# Patient Record
Sex: MALE | Race: OTHER | HISPANIC OR LATINO | URBAN - METROPOLITAN AREA
[De-identification: names, ages, dates, MRNs, and addresses within clinical notes are randomized per-mention and may not be internally consistent; named-entity substitution may affect disease eponyms.]

---

## 2019-03-04 ENCOUNTER — EMERGENCY (EMERGENCY)
Facility: HOSPITAL | Age: 29
LOS: 0 days | Discharge: HOME | End: 2019-03-04
Attending: EMERGENCY MEDICINE | Admitting: EMERGENCY MEDICINE
Payer: MEDICAID

## 2019-03-04 VITALS
SYSTOLIC BLOOD PRESSURE: 135 MMHG | RESPIRATION RATE: 18 BRPM | OXYGEN SATURATION: 100 % | DIASTOLIC BLOOD PRESSURE: 80 MMHG | HEART RATE: 80 BPM | TEMPERATURE: 98 F

## 2019-03-04 VITALS — HEIGHT: 68 IN | WEIGHT: 139.99 LBS

## 2019-03-04 DIAGNOSIS — M54.5 LOW BACK PAIN: ICD-10-CM

## 2019-03-04 DIAGNOSIS — Z87.442 PERSONAL HISTORY OF URINARY CALCULI: ICD-10-CM

## 2019-03-04 DIAGNOSIS — Z85.47 PERSONAL HISTORY OF MALIGNANT NEOPLASM OF TESTIS: ICD-10-CM

## 2019-03-04 DIAGNOSIS — Z85.9 PERSONAL HISTORY OF MALIGNANT NEOPLASM, UNSPECIFIED: ICD-10-CM

## 2019-03-04 DIAGNOSIS — Z90.79 ACQUIRED ABSENCE OF OTHER GENITAL ORGAN(S): ICD-10-CM

## 2019-03-04 DIAGNOSIS — Z79.899 OTHER LONG TERM (CURRENT) DRUG THERAPY: ICD-10-CM

## 2019-03-04 DIAGNOSIS — R11.10 VOMITING, UNSPECIFIED: ICD-10-CM

## 2019-03-04 LAB
ALBUMIN SERPL ELPH-MCNC: 5.1 G/DL — HIGH (ref 3.3–5)
ALP SERPL-CCNC: 66 U/L — SIGNIFICANT CHANGE UP (ref 40–120)
ALT FLD-CCNC: 38 U/L — SIGNIFICANT CHANGE UP (ref 12–78)
ANION GAP SERPL CALC-SCNC: 9 MMOL/L — SIGNIFICANT CHANGE UP (ref 5–17)
APPEARANCE UR: CLEAR — SIGNIFICANT CHANGE UP
AST SERPL-CCNC: 20 U/L — SIGNIFICANT CHANGE UP (ref 15–37)
BASOPHILS # BLD AUTO: 0.02 K/UL — SIGNIFICANT CHANGE UP (ref 0–0.2)
BASOPHILS NFR BLD AUTO: 0.2 % — SIGNIFICANT CHANGE UP (ref 0–2)
BILIRUB SERPL-MCNC: 0.9 MG/DL — SIGNIFICANT CHANGE UP (ref 0.2–1.2)
BILIRUB UR-MCNC: NEGATIVE — SIGNIFICANT CHANGE UP
BUN SERPL-MCNC: 13 MG/DL — SIGNIFICANT CHANGE UP (ref 7–23)
CALCIUM SERPL-MCNC: 9.8 MG/DL — SIGNIFICANT CHANGE UP (ref 8.5–10.1)
CHLORIDE SERPL-SCNC: 102 MMOL/L — SIGNIFICANT CHANGE UP (ref 96–108)
CO2 SERPL-SCNC: 29 MMOL/L — SIGNIFICANT CHANGE UP (ref 22–31)
COLOR SPEC: YELLOW — SIGNIFICANT CHANGE UP
COMMENT - URINE: SIGNIFICANT CHANGE UP
CREAT SERPL-MCNC: 0.97 MG/DL — SIGNIFICANT CHANGE UP (ref 0.5–1.3)
DIFF PNL FLD: ABNORMAL
EOSINOPHIL # BLD AUTO: 0.02 K/UL — SIGNIFICANT CHANGE UP (ref 0–0.5)
EOSINOPHIL NFR BLD AUTO: 0.2 % — SIGNIFICANT CHANGE UP (ref 0–6)
EPI CELLS # UR: SIGNIFICANT CHANGE UP
GLUCOSE SERPL-MCNC: 96 MG/DL — SIGNIFICANT CHANGE UP (ref 70–99)
GLUCOSE UR QL: NEGATIVE MG/DL — SIGNIFICANT CHANGE UP
HCT VFR BLD CALC: 48 % — SIGNIFICANT CHANGE UP (ref 39–50)
HGB BLD-MCNC: 16.7 G/DL — SIGNIFICANT CHANGE UP (ref 13–17)
IMM GRANULOCYTES NFR BLD AUTO: 0.2 % — SIGNIFICANT CHANGE UP (ref 0–1.5)
KETONES UR-MCNC: ABNORMAL
LEUKOCYTE ESTERASE UR-ACNC: ABNORMAL
LYMPHOCYTES # BLD AUTO: 2.01 K/UL — SIGNIFICANT CHANGE UP (ref 1–3.3)
LYMPHOCYTES # BLD AUTO: 25.1 % — SIGNIFICANT CHANGE UP (ref 13–44)
MCHC RBC-ENTMCNC: 29.9 PG — SIGNIFICANT CHANGE UP (ref 27–34)
MCHC RBC-ENTMCNC: 34.8 GM/DL — SIGNIFICANT CHANGE UP (ref 32–36)
MCV RBC AUTO: 85.9 FL — SIGNIFICANT CHANGE UP (ref 80–100)
MONOCYTES # BLD AUTO: 0.43 K/UL — SIGNIFICANT CHANGE UP (ref 0–0.9)
MONOCYTES NFR BLD AUTO: 5.4 % — SIGNIFICANT CHANGE UP (ref 2–14)
NEUTROPHILS # BLD AUTO: 5.52 K/UL — SIGNIFICANT CHANGE UP (ref 1.8–7.4)
NEUTROPHILS NFR BLD AUTO: 68.9 % — SIGNIFICANT CHANGE UP (ref 43–77)
NITRITE UR-MCNC: NEGATIVE — SIGNIFICANT CHANGE UP
NRBC # BLD: 0 /100 WBCS — SIGNIFICANT CHANGE UP (ref 0–0)
PH UR: 7 — SIGNIFICANT CHANGE UP (ref 5–8)
PLATELET # BLD AUTO: 223 K/UL — SIGNIFICANT CHANGE UP (ref 150–400)
POTASSIUM SERPL-MCNC: 3.9 MMOL/L — SIGNIFICANT CHANGE UP (ref 3.5–5.3)
POTASSIUM SERPL-SCNC: 3.9 MMOL/L — SIGNIFICANT CHANGE UP (ref 3.5–5.3)
PROT SERPL-MCNC: 9 GM/DL — HIGH (ref 6–8.3)
PROT UR-MCNC: 15 MG/DL
RBC # BLD: 5.59 M/UL — SIGNIFICANT CHANGE UP (ref 4.2–5.8)
RBC # FLD: 12.5 % — SIGNIFICANT CHANGE UP (ref 10.3–14.5)
RBC CASTS # UR COMP ASSIST: SIGNIFICANT CHANGE UP /HPF (ref 0–4)
SODIUM SERPL-SCNC: 140 MMOL/L — SIGNIFICANT CHANGE UP (ref 135–145)
SP GR SPEC: 1 — LOW (ref 1.01–1.02)
UROBILINOGEN FLD QL: NEGATIVE MG/DL — SIGNIFICANT CHANGE UP
WBC # BLD: 8.02 K/UL — SIGNIFICANT CHANGE UP (ref 3.8–10.5)
WBC # FLD AUTO: 8.02 K/UL — SIGNIFICANT CHANGE UP (ref 3.8–10.5)
WBC UR QL: SIGNIFICANT CHANGE UP

## 2019-03-04 PROCEDURE — 99284 EMERGENCY DEPT VISIT MOD MDM: CPT

## 2019-03-04 PROCEDURE — 74176 CT ABD & PELVIS W/O CONTRAST: CPT | Mod: 26

## 2019-03-04 RX ORDER — SODIUM CHLORIDE 9 MG/ML
3 INJECTION INTRAMUSCULAR; INTRAVENOUS; SUBCUTANEOUS ONCE
Qty: 0 | Refills: 0 | Status: COMPLETED | OUTPATIENT
Start: 2019-03-04 | End: 2019-03-04

## 2019-03-04 RX ORDER — SODIUM CHLORIDE 9 MG/ML
1000 INJECTION INTRAMUSCULAR; INTRAVENOUS; SUBCUTANEOUS ONCE
Qty: 0 | Refills: 0 | Status: COMPLETED | OUTPATIENT
Start: 2019-03-04 | End: 2019-03-04

## 2019-03-04 RX ORDER — METHOCARBAMOL 500 MG/1
1 TABLET, FILM COATED ORAL
Qty: 20 | Refills: 0
Start: 2019-03-04 | End: 2019-03-08

## 2019-03-04 RX ORDER — KETOROLAC TROMETHAMINE 30 MG/ML
30 SYRINGE (ML) INJECTION ONCE
Qty: 0 | Refills: 0 | Status: DISCONTINUED | OUTPATIENT
Start: 2019-03-04 | End: 2019-03-04

## 2019-03-04 RX ADMIN — SODIUM CHLORIDE 3 MILLILITER(S): 9 INJECTION INTRAMUSCULAR; INTRAVENOUS; SUBCUTANEOUS at 10:43

## 2019-03-04 RX ADMIN — SODIUM CHLORIDE 1000 MILLILITER(S): 9 INJECTION INTRAMUSCULAR; INTRAVENOUS; SUBCUTANEOUS at 10:42

## 2019-03-04 RX ADMIN — Medication 30 MILLIGRAM(S): at 12:15

## 2019-03-04 NOTE — ED STATDOCS - PROGRESS NOTE DETAILS
29 y/o male with a PMHx of testicular CA with metastasis, s/p left testicle removal (testosterone replacement therapy, stopped last year), kidney stones presents to the ED c/o lower back pain x1 week, worsening since onset. Pain exacerbated with ambulation. Pt has been taking Advil for the pain with relief. Denies fevers or dysuria. +vomiting last night. Hx of kidney stones 2 years ago, passed on their own. States this episode feels similar. -Dilip Carreno PA-C Results reviewed and discussed with pt. Recommend NSAIDs for pain. Pt is otherwise well appearing and stable for dc. Discussed importance of close FU with PMD.  Pt asked to return to ED immediately for any new or concerning sx or worsening sx. Pt acknowledges and understands plan. -Dilip Carreno PA-C

## 2019-03-04 NOTE — ED ADULT TRIAGE NOTE - CHIEF COMPLAINT QUOTE
c/o left lower back pain started yesterday, denies acute injury to area, pt states pain similar to when he had a kidney stone, denies urinary symptoms

## 2019-03-04 NOTE — ED ADULT NURSE NOTE - OBJECTIVE STATEMENT
pt c/o left flank pain with nausea and diarrhea. pt has h/o  testicular cancer  when was 3 months of age had  his testicle removed was on testosterone but stopped taking it few weeks ago .

## 2019-03-04 NOTE — ED STATDOCS - OBJECTIVE STATEMENT
27 y/o male with a PMHx of testicular CA with metastasis, s/p left testicle removal (testosterone replacement therapy, stopped last year), kidney stones presents to the ED c/o lower back pain x1 week, worsening since onset. Pain exacerbated with ambulation. Pt has been taking Advil for the pain with relief. Denies fevers or dysuria. +vomiting last night. Hx of kidney stones 2 years ago, passed on their own. States this episode feels similar. NKDA. Marijuana use. Social ETOH use. No tobacco use.

## 2019-03-04 NOTE — ED ADULT NURSE NOTE - CAS DISCH TRANSFER METHOD
Notified MD Ian of glucose 619. MD to make changes to insulin. Will continue to monitor.   Private car

## 2019-03-06 ENCOUNTER — FORM ENCOUNTER (OUTPATIENT)
Age: 29
End: 2019-03-06

## 2019-03-07 ENCOUNTER — OUTPATIENT (OUTPATIENT)
Dept: OUTPATIENT SERVICES | Facility: HOSPITAL | Age: 29
LOS: 1 days | End: 2019-03-07
Payer: MEDICAID

## 2019-03-07 ENCOUNTER — TRANSCRIPTION ENCOUNTER (OUTPATIENT)
Age: 29
End: 2019-03-07

## 2019-03-07 ENCOUNTER — APPOINTMENT (OUTPATIENT)
Dept: UROLOGY | Facility: CLINIC | Age: 29
End: 2019-03-07
Payer: COMMERCIAL

## 2019-03-07 ENCOUNTER — APPOINTMENT (OUTPATIENT)
Dept: ULTRASOUND IMAGING | Facility: CLINIC | Age: 29
End: 2019-03-07
Payer: COMMERCIAL

## 2019-03-07 VITALS
SYSTOLIC BLOOD PRESSURE: 124 MMHG | DIASTOLIC BLOOD PRESSURE: 75 MMHG | HEIGHT: 68 IN | TEMPERATURE: 98 F | OXYGEN SATURATION: 98 % | WEIGHT: 145 LBS | BODY MASS INDEX: 21.98 KG/M2 | HEART RATE: 76 BPM

## 2019-03-07 DIAGNOSIS — N20.0 CALCULUS OF KIDNEY: ICD-10-CM

## 2019-03-07 DIAGNOSIS — D49.59 NEOPLASM UNSPECIFIED BEHAVIOR OF OTHER GENITOURINARY ORGAN: ICD-10-CM

## 2019-03-07 DIAGNOSIS — C62.90 MALIGNANT NEOPLASM OF UNSPECIFIED TESTIS, UNSPECIFIED WHETHER DESCENDED OR UNDESCENDED: ICD-10-CM

## 2019-03-07 DIAGNOSIS — Z00.8 ENCOUNTER FOR OTHER GENERAL EXAMINATION: ICD-10-CM

## 2019-03-07 PROCEDURE — 99204 OFFICE O/P NEW MOD 45 MIN: CPT

## 2019-03-07 PROCEDURE — 93975 VASCULAR STUDY: CPT

## 2019-03-07 PROCEDURE — 93975 VASCULAR STUDY: CPT | Mod: 26

## 2019-03-07 RX ORDER — DOXYCYCLINE 100 MG/1
100 TABLET, FILM COATED ORAL
Qty: 14 | Refills: 0 | Status: ACTIVE | COMMUNITY
Start: 2019-03-07 | End: 1900-01-01

## 2019-03-07 NOTE — HISTORY OF PRESENT ILLNESS
[FreeTextEntry1] : This patient states he has long-term left-sided back pain. He had a left testicular tumor removed many years ago and had undergone chemotherapy and lymph node dissection. A CAT scan shows a large calcification and the psoas muscle on the left side. He also states that he has been medicated in the past for low testosterone all in the past year and wishes to resume his therapy.  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

## 2019-03-07 NOTE — PHYSICAL EXAM
[General Appearance - Well Developed] : well developed [General Appearance - Well Nourished] : well nourished [Normal Appearance] : normal appearance [Well Groomed] : well groomed [General Appearance - In No Acute Distress] : no acute distress [Edema] : no peripheral edema [Respiration, Rhythm And Depth] : normal respiratory rhythm and effort [Exaggerated Use Of Accessory Muscles For Inspiration] : no accessory muscle use [Abdomen Soft] : soft [Abdomen Tenderness] : non-tender [Costovertebral Angle Tenderness] : no ~M costovertebral angle tenderness [Urethral Meatus] : meatus normal [Urinary Bladder Findings] : the bladder was normal on palpation [Scrotum] : the scrotum was normal [Testes Mass (___cm)] : there were no testicular masses [No Prostate Nodules] : no prostate nodules [FreeTextEntry1] : Absent left testicle, mildly tender right [Normal Station and Gait] : the gait and station were normal for the patient's age [] : no rash [No Focal Deficits] : no focal deficits [Oriented To Time, Place, And Person] : oriented to person, place, and time [Affect] : the affect was normal [Mood] : the mood was normal [Not Anxious] : not anxious [No Palpable Adenopathy] : no palpable adenopathy

## 2019-03-07 NOTE — ASSESSMENT
[FreeTextEntry1] : I believe his pain to be either postsurgical scarring or back related but not related to the stones in his kidney. Renal stone prevention was gone over he will start with hydration and usable in one day services. He has a history of testicular cancer and marker levels will be drawn.  He has low testosterone by history and appropriate levels are grown on the related hormones involved in this reduction as well. He'll return in several weeks for review and possible strut of testosterone

## 2019-03-07 NOTE — REVIEW OF SYSTEMS
[Chest Pain] : chest pain [Shortness Of Breath] : shortness of breath [Abdominal Pain] : abdominal pain [Vomiting] : vomiting [Constipation] : constipation [Heartburn] : heartburn [Loss of interest] : loss of interest in sexual activity [Sexually Transmitted Disease (STD)] : sexually transmitted disease [Poor quality erections] : Poor quality erections [No erections] : no erections [Seen by urologist before (Name)  ___] : Preciously seen by a urologist: [unfilled] [Pain during urination] : pain during urination [Told you have blood in urine on a urine test] : told blood was present in a urine test [Discharge from urine canal] : discharge from urine canal [History of kidney stones] : history of kidney stones [Strain or push to urinate] : strain or push to urinate [Wait a long time to urinate] : waits a long time to urinate [Bladder fullness after urinating] : bladder fullness after urinating [Joint Pain] : joint pain [Difficulty Walking] : difficulty walking [Feelings Of Weakness] : feelings of weakness [see HPI] : see HPI [Negative] : Endocrine

## 2019-03-09 LAB
APPEARANCE: CLEAR
BACTERIA: NEGATIVE
BILIRUBIN URINE: NEGATIVE
BLOOD URINE: NEGATIVE
COLOR: NORMAL
FSH SERPL-MCNC: 10.1 IU/L
GLUCOSE QUALITATIVE U: NEGATIVE
HCG SERPL-MCNC: <1 MIU/ML
HYALINE CASTS: 0 /LPF
KETONES URINE: NEGATIVE
LEUKOCYTE ESTERASE URINE: NEGATIVE
LH SERPL-ACNC: 9.2 IU/L
MICROSCOPIC-UA: NORMAL
NITRITE URINE: NEGATIVE
PH URINE: 7
PROLACTIN SERPL-MCNC: 5.4 NG/ML
PROTEIN URINE: NORMAL
RED BLOOD CELLS URINE: 3 /HPF
SPECIFIC GRAVITY URINE: 1.02
SQUAMOUS EPITHELIAL CELLS: 0 /HPF
TESTOST SERPL-MCNC: 359 NG/DL
UROBILINOGEN URINE: NORMAL
WHITE BLOOD CELLS URINE: 0 /HPF

## 2019-03-10 ENCOUNTER — EMERGENCY (EMERGENCY)
Facility: HOSPITAL | Age: 29
LOS: 0 days | Discharge: HOME | End: 2019-03-10
Attending: EMERGENCY MEDICINE | Admitting: EMERGENCY MEDICINE
Payer: MEDICAID

## 2019-03-10 VITALS
HEART RATE: 80 BPM | SYSTOLIC BLOOD PRESSURE: 126 MMHG | DIASTOLIC BLOOD PRESSURE: 81 MMHG | OXYGEN SATURATION: 100 % | RESPIRATION RATE: 17 BRPM

## 2019-03-10 VITALS
RESPIRATION RATE: 18 BRPM | DIASTOLIC BLOOD PRESSURE: 97 MMHG | TEMPERATURE: 98 F | HEIGHT: 71 IN | HEART RATE: 78 BPM | WEIGHT: 184.97 LBS | SYSTOLIC BLOOD PRESSURE: 133 MMHG | OXYGEN SATURATION: 100 %

## 2019-03-10 DIAGNOSIS — F10.10 ALCOHOL ABUSE, UNCOMPLICATED: ICD-10-CM

## 2019-03-10 DIAGNOSIS — R53.1 WEAKNESS: ICD-10-CM

## 2019-03-10 DIAGNOSIS — Z85.47 PERSONAL HISTORY OF MALIGNANT NEOPLASM OF TESTIS: ICD-10-CM

## 2019-03-10 DIAGNOSIS — F12.10 CANNABIS ABUSE, UNCOMPLICATED: ICD-10-CM

## 2019-03-10 LAB
ALBUMIN SERPL ELPH-MCNC: 4.3 G/DL — SIGNIFICANT CHANGE UP (ref 3.3–5)
ALP SERPL-CCNC: 58 U/L — SIGNIFICANT CHANGE UP (ref 40–120)
ALT FLD-CCNC: 35 U/L — SIGNIFICANT CHANGE UP (ref 12–78)
ANION GAP SERPL CALC-SCNC: 9 MMOL/L — SIGNIFICANT CHANGE UP (ref 5–17)
APAP SERPL-MCNC: < 2 UG/ML (ref 10–30)
APPEARANCE UR: CLEAR — SIGNIFICANT CHANGE UP
AST SERPL-CCNC: 23 U/L — SIGNIFICANT CHANGE UP (ref 15–37)
BACTERIA # UR AUTO: ABNORMAL
BASOPHILS # BLD AUTO: 0.03 K/UL — SIGNIFICANT CHANGE UP (ref 0–0.2)
BASOPHILS NFR BLD AUTO: 0.3 % — SIGNIFICANT CHANGE UP (ref 0–2)
BILIRUB SERPL-MCNC: 0.5 MG/DL — SIGNIFICANT CHANGE UP (ref 0.2–1.2)
BILIRUB UR-MCNC: NEGATIVE — SIGNIFICANT CHANGE UP
BUN SERPL-MCNC: 17 MG/DL — SIGNIFICANT CHANGE UP (ref 7–23)
CALCIUM SERPL-MCNC: 9.4 MG/DL — SIGNIFICANT CHANGE UP (ref 8.5–10.1)
CHLORIDE SERPL-SCNC: 107 MMOL/L — SIGNIFICANT CHANGE UP (ref 96–108)
CK SERPL-CCNC: 149 U/L — SIGNIFICANT CHANGE UP (ref 26–308)
CO2 SERPL-SCNC: 26 MMOL/L — SIGNIFICANT CHANGE UP (ref 22–31)
COLOR SPEC: YELLOW — SIGNIFICANT CHANGE UP
CREAT SERPL-MCNC: 1.05 MG/DL — SIGNIFICANT CHANGE UP (ref 0.5–1.3)
DIFF PNL FLD: NEGATIVE — SIGNIFICANT CHANGE UP
EOSINOPHIL # BLD AUTO: 0.14 K/UL — SIGNIFICANT CHANGE UP (ref 0–0.5)
EOSINOPHIL NFR BLD AUTO: 1.4 % — SIGNIFICANT CHANGE UP (ref 0–6)
EPI CELLS # UR: SIGNIFICANT CHANGE UP
ETHANOL SERPL-MCNC: <10 MG/DL — SIGNIFICANT CHANGE UP (ref 0–10)
GLUCOSE SERPL-MCNC: 108 MG/DL — HIGH (ref 70–99)
GLUCOSE UR QL: NEGATIVE MG/DL — SIGNIFICANT CHANGE UP
HCT VFR BLD CALC: 43.7 % — SIGNIFICANT CHANGE UP (ref 39–50)
HGB BLD-MCNC: 15.7 G/DL — SIGNIFICANT CHANGE UP (ref 13–17)
IMM GRANULOCYTES NFR BLD AUTO: 0.3 % — SIGNIFICANT CHANGE UP (ref 0–1.5)
KETONES UR-MCNC: NEGATIVE — SIGNIFICANT CHANGE UP
LEUKOCYTE ESTERASE UR-ACNC: ABNORMAL
LYMPHOCYTES # BLD AUTO: 2.24 K/UL — SIGNIFICANT CHANGE UP (ref 1–3.3)
LYMPHOCYTES # BLD AUTO: 21.9 % — SIGNIFICANT CHANGE UP (ref 13–44)
MAGNESIUM SERPL-MCNC: 2.1 MG/DL — SIGNIFICANT CHANGE UP (ref 1.6–2.6)
MCHC RBC-ENTMCNC: 30.3 PG — SIGNIFICANT CHANGE UP (ref 27–34)
MCHC RBC-ENTMCNC: 35.9 GM/DL — SIGNIFICANT CHANGE UP (ref 32–36)
MCV RBC AUTO: 84.2 FL — SIGNIFICANT CHANGE UP (ref 80–100)
MONOCYTES # BLD AUTO: 0.62 K/UL — SIGNIFICANT CHANGE UP (ref 0–0.9)
MONOCYTES NFR BLD AUTO: 6.1 % — SIGNIFICANT CHANGE UP (ref 2–14)
NEUTROPHILS # BLD AUTO: 7.18 K/UL — SIGNIFICANT CHANGE UP (ref 1.8–7.4)
NEUTROPHILS NFR BLD AUTO: 70 % — SIGNIFICANT CHANGE UP (ref 43–77)
NITRITE UR-MCNC: NEGATIVE — SIGNIFICANT CHANGE UP
NRBC # BLD: 0 /100 WBCS — SIGNIFICANT CHANGE UP (ref 0–0)
PH UR: 5 — SIGNIFICANT CHANGE UP (ref 5–8)
PLATELET # BLD AUTO: 204 K/UL — SIGNIFICANT CHANGE UP (ref 150–400)
POTASSIUM SERPL-MCNC: 3.9 MMOL/L — SIGNIFICANT CHANGE UP (ref 3.5–5.3)
POTASSIUM SERPL-SCNC: 3.9 MMOL/L — SIGNIFICANT CHANGE UP (ref 3.5–5.3)
PROT SERPL-MCNC: 7.7 GM/DL — SIGNIFICANT CHANGE UP (ref 6–8.3)
PROT UR-MCNC: NEGATIVE MG/DL — SIGNIFICANT CHANGE UP
RBC # BLD: 5.19 M/UL — SIGNIFICANT CHANGE UP (ref 4.2–5.8)
RBC # FLD: 12.2 % — SIGNIFICANT CHANGE UP (ref 10.3–14.5)
RBC CASTS # UR COMP ASSIST: SIGNIFICANT CHANGE UP /HPF (ref 0–4)
SALICYLATES SERPL-MCNC: <1.7 MG/DL (ref 2.8–20)
SODIUM SERPL-SCNC: 142 MMOL/L — SIGNIFICANT CHANGE UP (ref 135–145)
SP GR SPEC: 1.02 — SIGNIFICANT CHANGE UP (ref 1.01–1.02)
UROBILINOGEN FLD QL: NEGATIVE MG/DL — SIGNIFICANT CHANGE UP
WBC # BLD: 10.24 K/UL — SIGNIFICANT CHANGE UP (ref 3.8–10.5)
WBC # FLD AUTO: 10.24 K/UL — SIGNIFICANT CHANGE UP (ref 3.8–10.5)
WBC UR QL: SIGNIFICANT CHANGE UP

## 2019-03-10 PROCEDURE — 99283 EMERGENCY DEPT VISIT LOW MDM: CPT

## 2019-03-10 RX ORDER — SODIUM CHLORIDE 9 MG/ML
1000 INJECTION INTRAMUSCULAR; INTRAVENOUS; SUBCUTANEOUS ONCE
Qty: 0 | Refills: 0 | Status: COMPLETED | OUTPATIENT
Start: 2019-03-10 | End: 2019-03-10

## 2019-03-10 RX ADMIN — SODIUM CHLORIDE 2000 MILLILITER(S): 9 INJECTION INTRAMUSCULAR; INTRAVENOUS; SUBCUTANEOUS at 04:49

## 2019-03-10 NOTE — ED ADULT NURSE NOTE - OBJECTIVE STATEMENT
Pt presents to ED after admitting to have smoked marijuana today. pt admits to generalized myalgias and generalized weakness. DeniesSI/HI and no hallucinations. Denies fever. Denies HA or neck pain. no chest pain or sob. no abd pain. no n/v/d. no urinary f/u/d. no back pain. no motor or sensory deficits. denies illicit drug use. no recent travel. no rash. no other acute issues symptoms or

## 2019-03-10 NOTE — ED ADULT TRIAGE NOTE - CHIEF COMPLAINT QUOTE
Patient reports that he "smoked a ton of weed and stuff" prior to bedtime tonight and now "I can't move my arms." Patient is moving his arms in triage.

## 2019-03-10 NOTE — ED PROVIDER NOTE - PROGRESS NOTE DETAILS
neuro exam now showing neuro: CN II - XII intact, EOMI, PERRL, no papilledema, 5/5 muscle strength x 4 extremities, no sensory deficits, 2+ dtr globally, negative babinski, no ataxic gait, normal SHAYNE and FNT, normal romberg ambulating in nad pts signigfacnt other will be home to watch pt return to ed for intractable HA, persistent vomiting, or new onset motor/sensory deficits

## 2019-03-10 NOTE — ED PROVIDER NOTE - CLINICAL SUMMARY MEDICAL DECISION MAKING FREE TEXT BOX
see progress note suspect weaknes transient secondary to marijua abuse however now back ot basleine no neuro deficits no si no hi and return precautions were given

## 2019-03-10 NOTE — ED PROVIDER NOTE - OBJECTIVE STATEMENT
pt admists to genralized myalgias and genralized weakness after smoking marijuana today. no si no hi no halluincations. denies fever. denies HA or neck pain. no chest pain or sob. no abd pain. no n/v/d. no urinary f/u/d. no back pain. no motor or sensory deficits. denies illicit drug use. no recent travel. no rash. no other acute issues symptoms or concerns

## 2019-03-10 NOTE — ED ADULT NURSE NOTE - NSIMPLEMENTINTERV_GEN_ALL_ED
Implemented All Fall Risk Interventions:  [place] to call system. Call bell, personal items and telephone within reach. Instruct patient to call for assistance. Room bathroom lighting operational. Non-slip footwear when patient is off stretcher. Physically safe environment: no spills, clutter or unnecessary equipment. Stretcher in lowest position, wheels locked, appropriate side rails in place. Provide visual cue, wrist band, yellow gown, etc. Monitor gait and stability. Monitor for mental status changes and reorient to person, place, and time. Review medications for side effects contributing to fall risk. Reinforce activity limits and safety measures with patient and family.

## 2019-03-12 ENCOUNTER — APPOINTMENT (OUTPATIENT)
Dept: UROLOGY | Facility: CLINIC | Age: 29
End: 2019-03-12

## 2019-03-12 LAB
AFPL3 RESULTS RECEIVED: NORMAL
ALPHA-1-FETOPROTEIN-L3: NORMAL %
ALPHA-1-FETOPROTEIN: 1.6 NG/ML

## 2019-03-22 ENCOUNTER — APPOINTMENT (OUTPATIENT)
Dept: UROLOGY | Facility: CLINIC | Age: 29
End: 2019-03-22
Payer: COMMERCIAL

## 2019-03-22 DIAGNOSIS — R10.9 UNSPECIFIED ABDOMINAL PAIN: ICD-10-CM

## 2019-03-22 DIAGNOSIS — N50.819 TESTICULAR PAIN, UNSPECIFIED: ICD-10-CM

## 2019-03-22 PROCEDURE — 99213 OFFICE O/P EST LOW 20 MIN: CPT | Mod: 25

## 2019-03-22 PROCEDURE — 96372 THER/PROPH/DIAG INJ SC/IM: CPT

## 2019-03-23 NOTE — ASSESSMENT
[FreeTextEntry1] : The patient wishes a T injection and  I also advised that counseling for depression might be in order

## 2019-03-23 NOTE — REVIEW OF SYSTEMS
[Chest Pain] : chest pain [Shortness Of Breath] : shortness of breath [Abdominal Pain] : abdominal pain [Vomiting] : vomiting [Constipation] : constipation [Heartburn] : heartburn [Loss of interest] : loss of interest in sexual activity [Sexually Transmitted Disease (STD)] : sexually transmitted disease [Poor quality erections] : Poor quality erections [No erections] : no erections [Seen by urologist before (Name)  ___] : Preciously seen by a urologist: [unfilled] [Pain during urination] : pain during urination [Told you have blood in urine on a urine test] : told blood was present in a urine test [Discharge from urine canal] : discharge from urine canal [History of kidney stones] : history of kidney stones [Strain or push to urinate] : strain or push to urinate [Wait a long time to urinate] : waits a long time to urinate [Bladder fullness after urinating] : bladder fullness after urinating [see HPI] : see HPI [Joint Pain] : joint pain [Difficulty Walking] : difficulty walking [Feelings Of Weakness] : feelings of weakness [Negative] : Heme/Lymph

## 2019-03-23 NOTE — PHYSICAL EXAM
[General Appearance - Well Developed] : well developed [General Appearance - Well Nourished] : well nourished [Normal Appearance] : normal appearance [Well Groomed] : well groomed [General Appearance - In No Acute Distress] : no acute distress [Abdomen Soft] : soft [Abdomen Tenderness] : non-tender [Costovertebral Angle Tenderness] : no ~M costovertebral angle tenderness [Urethral Meatus] : meatus normal [Urinary Bladder Findings] : the bladder was normal on palpation [Scrotum] : the scrotum was normal [Testes Mass (___cm)] : there were no testicular masses [No Prostate Nodules] : no prostate nodules [FreeTextEntry1] : Absent left testicle, mildly tender right [Edema] : no peripheral edema [] : no respiratory distress [Respiration, Rhythm And Depth] : normal respiratory rhythm and effort [Exaggerated Use Of Accessory Muscles For Inspiration] : no accessory muscle use [Oriented To Time, Place, And Person] : oriented to person, place, and time [Affect] : the affect was normal [Mood] : the mood was normal [Not Anxious] : not anxious [Normal Station and Gait] : the gait and station were normal for the patient's age [No Focal Deficits] : no focal deficits [No Palpable Adenopathy] : no palpable adenopathy

## 2019-03-23 NOTE — HISTORY OF PRESENT ILLNESS
[FreeTextEntry1] : The patient is here for a review of results and woud like to try a T injection.

## 2019-04-14 ENCOUNTER — FORM ENCOUNTER (OUTPATIENT)
Age: 29
End: 2019-04-14

## 2019-04-15 ENCOUNTER — APPOINTMENT (OUTPATIENT)
Dept: CT IMAGING | Facility: CLINIC | Age: 29
End: 2019-04-15
Payer: MEDICAID

## 2019-04-15 ENCOUNTER — OUTPATIENT (OUTPATIENT)
Dept: OUTPATIENT SERVICES | Facility: HOSPITAL | Age: 29
LOS: 1 days | End: 2019-04-15
Payer: MEDICAID

## 2019-04-15 DIAGNOSIS — R10.9 UNSPECIFIED ABDOMINAL PAIN: ICD-10-CM

## 2019-04-15 PROCEDURE — 74178 CT ABD&PLV WO CNTR FLWD CNTR: CPT | Mod: 26

## 2019-04-15 PROCEDURE — 74178 CT ABD&PLV WO CNTR FLWD CNTR: CPT

## 2019-04-19 ENCOUNTER — APPOINTMENT (OUTPATIENT)
Age: 29
End: 2019-04-19
Payer: COMMERCIAL

## 2019-04-19 DIAGNOSIS — R79.89 OTHER SPECIFIED ABNORMAL FINDINGS OF BLOOD CHEMISTRY: ICD-10-CM

## 2019-04-19 PROCEDURE — 96372 THER/PROPH/DIAG INJ SC/IM: CPT

## 2019-04-29 ENCOUNTER — APPOINTMENT (OUTPATIENT)
Dept: NEUROSURGERY | Facility: CLINIC | Age: 29
End: 2019-04-29

## 2020-06-18 PROBLEM — N50.819 ORCHALGIA: Status: ACTIVE | Noted: 2019-03-07

## 2020-09-22 ENCOUNTER — EMERGENCY (EMERGENCY)
Facility: HOSPITAL | Age: 30
LOS: 0 days | Discharge: HOME | End: 2020-09-22
Payer: MEDICAID

## 2020-09-22 VITALS
RESPIRATION RATE: 16 BRPM | HEIGHT: 71 IN | TEMPERATURE: 98 F | OXYGEN SATURATION: 95 % | SYSTOLIC BLOOD PRESSURE: 119 MMHG | HEART RATE: 89 BPM | DIASTOLIC BLOOD PRESSURE: 72 MMHG

## 2020-09-22 DIAGNOSIS — Z20.828 CONTACT WITH AND (SUSPECTED) EXPOSURE TO OTHER VIRAL COMMUNICABLE DISEASES: ICD-10-CM

## 2020-09-22 PROCEDURE — U0003: CPT

## 2020-09-22 PROCEDURE — 99283 EMERGENCY DEPT VISIT LOW MDM: CPT

## 2020-09-22 NOTE — ED STATDOCS - PHYSICAL EXAMINATION
Constitutional: NAD AAOx3. Nontoxic, well appearing. Speaking full sentences  w/o distress  Head: Normocephalic atraumatic  Mouth: no airway obstruction  Cardiac: [id number]   Resp: Lungs CTA B/L  Abd: soft, nd. NTTP. No r/g/r.   Neuro: motor and sensory intact

## 2020-09-22 NOTE — ED ADULT NURSE NOTE - OBJECTIVE STATEMENT
Patient evaluated, treated and discharged by PA. Refer to PA note for assessment. Discharge instructions provided.  Patient provides verbal consent to receive results via text or email.

## 2020-09-22 NOTE — ED STATDOCS - PATIENT PORTAL LINK FT
You can access the FollowMyHealth Patient Portal offered by [facility] by registering at the following website: [email or web address]. By joining [facility]’s FollowMyHealth portal, you will also be able to view your health information using other applications (apps) compatible with our system.

## 2020-09-23 LAB — SARS-COV-2 RNA SPEC QL NAA+PROBE: SIGNIFICANT CHANGE UP

## 2020-11-02 ENCOUNTER — EMERGENCY (EMERGENCY)
Facility: HOSPITAL | Age: 30
LOS: 0 days | Discharge: HOME | End: 2020-11-02
Payer: MEDICAID

## 2020-11-02 VITALS
RESPIRATION RATE: 18 BRPM | HEIGHT: 71 IN | OXYGEN SATURATION: 100 % | DIASTOLIC BLOOD PRESSURE: 76 MMHG | TEMPERATURE: 98 F | SYSTOLIC BLOOD PRESSURE: 132 MMHG | HEART RATE: 71 BPM

## 2020-11-02 DIAGNOSIS — Z20.828 CONTACT WITH AND (SUSPECTED) EXPOSURE TO OTHER VIRAL COMMUNICABLE DISEASES: ICD-10-CM

## 2020-11-02 LAB — SARS-COV-2 RNA SPEC QL NAA+PROBE: SIGNIFICANT CHANGE UP

## 2020-11-02 PROCEDURE — 99283 EMERGENCY DEPT VISIT LOW MDM: CPT

## 2020-11-02 PROCEDURE — U0003: CPT

## 2020-11-02 NOTE — ED ADULT TRIAGE NOTE - CHIEF COMPLAINT QUOTE
PT to ED for COVID Testing. Denies symptoms Unknown exposure. needs test for travel. PT evaluated by PA. Verbal consent for email/text results given. Verbalized understanding of COVID d/c instructions.

## 2020-11-02 NOTE — ED ADULT NURSE NOTE - OBJECTIVE STATEMENT
PT to ED for COVID Testing. Denies symptoms Unknown exposure. PT evaluated by PA. Verbal consent for email/text results given. Verbalized understanding of COVID d/c instructions.

## 2020-11-02 NOTE — ED STATDOCS - PATIENT PORTAL LINK FT
You can access the FollowMyHealth Patient Portal offered by [facility] by registering at the following website: [email or web address]. By joining [facility]’s FollowMyHealth portal, you will also be able to view your health information using other applications (apps) compatible with our system.

## 2021-01-05 ENCOUNTER — OUTPATIENT (OUTPATIENT)
Dept: OUTPATIENT SERVICES | Facility: HOSPITAL | Age: 31
LOS: 1 days | End: 2021-01-05
Payer: MEDICAID

## 2021-01-05 DIAGNOSIS — Z20.828 CONTACT WITH AND (SUSPECTED) EXPOSURE TO OTHER VIRAL COMMUNICABLE DISEASES: ICD-10-CM

## 2021-01-05 LAB — SARS-COV-2 RNA SPEC QL NAA+PROBE: SIGNIFICANT CHANGE UP

## 2021-01-05 PROCEDURE — C9803: CPT

## 2021-01-05 PROCEDURE — U0005: CPT

## 2021-01-05 PROCEDURE — U0003: CPT

## 2021-01-06 DIAGNOSIS — Z20.828 CONTACT WITH AND (SUSPECTED) EXPOSURE TO OTHER VIRAL COMMUNICABLE DISEASES: ICD-10-CM

## 2021-01-30 ENCOUNTER — EMERGENCY (EMERGENCY)
Facility: HOSPITAL | Age: 31
LOS: 1 days | Discharge: HOME | End: 2021-01-30
Attending: EMERGENCY MEDICINE
Payer: MEDICAID

## 2021-01-30 VITALS
RESPIRATION RATE: 17 BRPM | OXYGEN SATURATION: 100 % | SYSTOLIC BLOOD PRESSURE: 132 MMHG | HEART RATE: 60 BPM | DIASTOLIC BLOOD PRESSURE: 88 MMHG

## 2021-01-30 VITALS
SYSTOLIC BLOOD PRESSURE: 135 MMHG | WEIGHT: 149.91 LBS | DIASTOLIC BLOOD PRESSURE: 79 MMHG | RESPIRATION RATE: 18 BRPM | TEMPERATURE: 98 F | HEART RATE: 73 BPM | OXYGEN SATURATION: 99 % | HEIGHT: 71 IN

## 2021-01-30 DIAGNOSIS — C62.90 MALIGNANT NEOPLASM OF UNSPECIFIED TESTIS, UNSPECIFIED WHETHER DESCENDED OR UNDESCENDED: Chronic | ICD-10-CM

## 2021-01-30 LAB
ALBUMIN SERPL ELPH-MCNC: 4.6 G/DL — SIGNIFICANT CHANGE UP (ref 3.3–5)
ALP SERPL-CCNC: 69 U/L — SIGNIFICANT CHANGE UP (ref 40–120)
ALT FLD-CCNC: 25 U/L — SIGNIFICANT CHANGE UP (ref 10–45)
ANION GAP SERPL CALC-SCNC: 10 MMOL/L — SIGNIFICANT CHANGE UP (ref 5–17)
APPEARANCE UR: CLEAR — SIGNIFICANT CHANGE UP
AST SERPL-CCNC: 15 U/L — SIGNIFICANT CHANGE UP (ref 10–40)
BASE EXCESS BLDV CALC-SCNC: 4.9 MMOL/L — HIGH (ref -2–2)
BASOPHILS # BLD AUTO: 0.01 K/UL — SIGNIFICANT CHANGE UP (ref 0–0.2)
BASOPHILS NFR BLD AUTO: 0.2 % — SIGNIFICANT CHANGE UP (ref 0–2)
BILIRUB SERPL-MCNC: 0.6 MG/DL — SIGNIFICANT CHANGE UP (ref 0.2–1.2)
BILIRUB UR-MCNC: NEGATIVE — SIGNIFICANT CHANGE UP
BUN SERPL-MCNC: 15 MG/DL — SIGNIFICANT CHANGE UP (ref 7–23)
CA-I SERPL-SCNC: 1.26 MMOL/L — SIGNIFICANT CHANGE UP (ref 1.12–1.3)
CALCIUM SERPL-MCNC: 10 MG/DL — SIGNIFICANT CHANGE UP (ref 8.4–10.5)
CHLORIDE BLDV-SCNC: 102 MMOL/L — SIGNIFICANT CHANGE UP (ref 96–108)
CHLORIDE SERPL-SCNC: 100 MMOL/L — SIGNIFICANT CHANGE UP (ref 96–108)
CO2 BLDV-SCNC: 34 MMOL/L — HIGH (ref 22–30)
CO2 SERPL-SCNC: 30 MMOL/L — SIGNIFICANT CHANGE UP (ref 22–31)
COLOR SPEC: SIGNIFICANT CHANGE UP
CREAT SERPL-MCNC: 1.12 MG/DL — SIGNIFICANT CHANGE UP (ref 0.5–1.3)
DIFF PNL FLD: NEGATIVE — SIGNIFICANT CHANGE UP
EOSINOPHIL # BLD AUTO: 0.1 K/UL — SIGNIFICANT CHANGE UP (ref 0–0.5)
EOSINOPHIL NFR BLD AUTO: 1.6 % — SIGNIFICANT CHANGE UP (ref 0–6)
GAS PNL BLDV: 140 MMOL/L — SIGNIFICANT CHANGE UP (ref 135–145)
GAS PNL BLDV: SIGNIFICANT CHANGE UP
GLUCOSE BLDV-MCNC: 89 MG/DL — SIGNIFICANT CHANGE UP (ref 70–99)
GLUCOSE SERPL-MCNC: 94 MG/DL — SIGNIFICANT CHANGE UP (ref 70–99)
GLUCOSE UR QL: NEGATIVE — SIGNIFICANT CHANGE UP
HCO3 BLDV-SCNC: 32 MMOL/L — HIGH (ref 21–29)
HCT VFR BLD CALC: 44.9 % — SIGNIFICANT CHANGE UP (ref 39–50)
HCT VFR BLDA CALC: 49 % — SIGNIFICANT CHANGE UP (ref 39–50)
HGB BLD CALC-MCNC: 16.1 G/DL — SIGNIFICANT CHANGE UP (ref 13–17)
HGB BLD-MCNC: 15 G/DL — SIGNIFICANT CHANGE UP (ref 13–17)
IMM GRANULOCYTES NFR BLD AUTO: 0.3 % — SIGNIFICANT CHANGE UP (ref 0–1.5)
KETONES UR-MCNC: NEGATIVE — SIGNIFICANT CHANGE UP
LACTATE BLDV-MCNC: 1.1 MMOL/L — SIGNIFICANT CHANGE UP (ref 0.7–2)
LEUKOCYTE ESTERASE UR-ACNC: NEGATIVE — SIGNIFICANT CHANGE UP
LIDOCAIN IGE QN: 24 U/L — SIGNIFICANT CHANGE UP (ref 7–60)
LYMPHOCYTES # BLD AUTO: 1.61 K/UL — SIGNIFICANT CHANGE UP (ref 1–3.3)
LYMPHOCYTES # BLD AUTO: 26 % — SIGNIFICANT CHANGE UP (ref 13–44)
MCHC RBC-ENTMCNC: 29.4 PG — SIGNIFICANT CHANGE UP (ref 27–34)
MCHC RBC-ENTMCNC: 33.4 GM/DL — SIGNIFICANT CHANGE UP (ref 32–36)
MCV RBC AUTO: 87.9 FL — SIGNIFICANT CHANGE UP (ref 80–100)
MONOCYTES # BLD AUTO: 0.44 K/UL — SIGNIFICANT CHANGE UP (ref 0–0.9)
MONOCYTES NFR BLD AUTO: 7.1 % — SIGNIFICANT CHANGE UP (ref 2–14)
NEUTROPHILS # BLD AUTO: 4.02 K/UL — SIGNIFICANT CHANGE UP (ref 1.8–7.4)
NEUTROPHILS NFR BLD AUTO: 64.8 % — SIGNIFICANT CHANGE UP (ref 43–77)
NITRITE UR-MCNC: NEGATIVE — SIGNIFICANT CHANGE UP
NRBC # BLD: 0 /100 WBCS — SIGNIFICANT CHANGE UP (ref 0–0)
PCO2 BLDV: 59 MMHG — HIGH (ref 35–50)
PH BLDV: 7.35 — SIGNIFICANT CHANGE UP (ref 7.35–7.45)
PH UR: 7.5 — SIGNIFICANT CHANGE UP (ref 5–8)
PLATELET # BLD AUTO: 193 K/UL — SIGNIFICANT CHANGE UP (ref 150–400)
PO2 BLDV: 23 MMHG — LOW (ref 25–45)
POTASSIUM BLDV-SCNC: 3.9 MMOL/L — SIGNIFICANT CHANGE UP (ref 3.5–5.3)
POTASSIUM SERPL-MCNC: 4.2 MMOL/L — SIGNIFICANT CHANGE UP (ref 3.5–5.3)
POTASSIUM SERPL-SCNC: 4.2 MMOL/L — SIGNIFICANT CHANGE UP (ref 3.5–5.3)
PROT SERPL-MCNC: 7.3 G/DL — SIGNIFICANT CHANGE UP (ref 6–8.3)
PROT UR-MCNC: NEGATIVE — SIGNIFICANT CHANGE UP
RBC # BLD: 5.11 M/UL — SIGNIFICANT CHANGE UP (ref 4.2–5.8)
RBC # FLD: 11.9 % — SIGNIFICANT CHANGE UP (ref 10.3–14.5)
SAO2 % BLDV: 34 % — LOW (ref 67–88)
SODIUM SERPL-SCNC: 140 MMOL/L — SIGNIFICANT CHANGE UP (ref 135–145)
SP GR SPEC: 1.02 — SIGNIFICANT CHANGE UP (ref 1.01–1.02)
UROBILINOGEN FLD QL: NEGATIVE — SIGNIFICANT CHANGE UP
WBC # BLD: 6.2 K/UL — SIGNIFICANT CHANGE UP (ref 3.8–10.5)
WBC # FLD AUTO: 6.2 K/UL — SIGNIFICANT CHANGE UP (ref 3.8–10.5)

## 2021-01-30 PROCEDURE — 81003 URINALYSIS AUTO W/O SCOPE: CPT

## 2021-01-30 PROCEDURE — 82330 ASSAY OF CALCIUM: CPT

## 2021-01-30 PROCEDURE — 82803 BLOOD GASES ANY COMBINATION: CPT

## 2021-01-30 PROCEDURE — 83605 ASSAY OF LACTIC ACID: CPT

## 2021-01-30 PROCEDURE — 85014 HEMATOCRIT: CPT

## 2021-01-30 PROCEDURE — 74177 CT ABD & PELVIS W/CONTRAST: CPT

## 2021-01-30 PROCEDURE — 99285 EMERGENCY DEPT VISIT HI MDM: CPT

## 2021-01-30 PROCEDURE — 96375 TX/PRO/DX INJ NEW DRUG ADDON: CPT | Mod: XU

## 2021-01-30 PROCEDURE — 99284 EMERGENCY DEPT VISIT MOD MDM: CPT | Mod: 25

## 2021-01-30 PROCEDURE — 96374 THER/PROPH/DIAG INJ IV PUSH: CPT | Mod: XU

## 2021-01-30 PROCEDURE — 85025 COMPLETE CBC W/AUTO DIFF WBC: CPT

## 2021-01-30 PROCEDURE — 86703 HIV-1/HIV-2 1 RESULT ANTBDY: CPT

## 2021-01-30 PROCEDURE — 84132 ASSAY OF SERUM POTASSIUM: CPT

## 2021-01-30 PROCEDURE — 80053 COMPREHEN METABOLIC PANEL: CPT

## 2021-01-30 PROCEDURE — 82947 ASSAY GLUCOSE BLOOD QUANT: CPT

## 2021-01-30 PROCEDURE — 82435 ASSAY OF BLOOD CHLORIDE: CPT

## 2021-01-30 PROCEDURE — 74177 CT ABD & PELVIS W/CONTRAST: CPT | Mod: 26,MA

## 2021-01-30 PROCEDURE — 84295 ASSAY OF SERUM SODIUM: CPT

## 2021-01-30 PROCEDURE — 96376 TX/PRO/DX INJ SAME DRUG ADON: CPT | Mod: XU

## 2021-01-30 PROCEDURE — 85018 HEMOGLOBIN: CPT

## 2021-01-30 PROCEDURE — 83690 ASSAY OF LIPASE: CPT

## 2021-01-30 RX ORDER — KETOROLAC TROMETHAMINE 30 MG/ML
15 SYRINGE (ML) INJECTION ONCE
Refills: 0 | Status: DISCONTINUED | OUTPATIENT
Start: 2021-01-30 | End: 2021-01-30

## 2021-01-30 RX ORDER — ACETAMINOPHEN 500 MG
975 TABLET ORAL ONCE
Refills: 0 | Status: COMPLETED | OUTPATIENT
Start: 2021-01-30 | End: 2021-01-30

## 2021-01-30 RX ORDER — FAMOTIDINE 10 MG/ML
20 INJECTION INTRAVENOUS ONCE
Refills: 0 | Status: COMPLETED | OUTPATIENT
Start: 2021-01-30 | End: 2021-01-30

## 2021-01-30 RX ORDER — SODIUM CHLORIDE 9 MG/ML
1000 INJECTION INTRAMUSCULAR; INTRAVENOUS; SUBCUTANEOUS ONCE
Refills: 0 | Status: COMPLETED | OUTPATIENT
Start: 2021-01-30 | End: 2021-01-30

## 2021-01-30 RX ORDER — ONDANSETRON 8 MG/1
4 TABLET, FILM COATED ORAL ONCE
Refills: 0 | Status: COMPLETED | OUTPATIENT
Start: 2021-01-30 | End: 2021-01-30

## 2021-01-30 RX ADMIN — ONDANSETRON 4 MILLIGRAM(S): 8 TABLET, FILM COATED ORAL at 14:07

## 2021-01-30 RX ADMIN — Medication 975 MILLIGRAM(S): at 16:55

## 2021-01-30 RX ADMIN — FAMOTIDINE 20 MILLIGRAM(S): 10 INJECTION INTRAVENOUS at 14:07

## 2021-01-30 RX ADMIN — Medication 15 MILLIGRAM(S): at 19:56

## 2021-01-30 RX ADMIN — SODIUM CHLORIDE 1000 MILLILITER(S): 9 INJECTION INTRAMUSCULAR; INTRAVENOUS; SUBCUTANEOUS at 14:07

## 2021-01-30 RX ADMIN — Medication 10 MILLIGRAM(S): at 20:29

## 2021-01-30 RX ADMIN — Medication 15 MILLIGRAM(S): at 16:55

## 2021-01-30 RX ADMIN — Medication 30 MILLILITER(S): at 14:07

## 2021-01-30 NOTE — ED PROVIDER NOTE - CLINICAL SUMMARY MEDICAL DECISION MAKING FREE TEXT BOX
Pt w/ hx of testicular cancer and abdominal mets s/p chemo and exlap w/ vague abdominal pain worse in L side. VSS. Exam w/o guarding or signs of acute abdomen. Given tolerating PO and mild findings on exam, unlikely surgical at this time. However, given hx of CA concern for new neoplastic disease vs appendicitis vs renal colic. Unlikely SBO at this time. Labs, IVF, meds, will reassess. If no improvement, will further investigate w/ CT.

## 2021-01-30 NOTE — ED ADULT NURSE NOTE - OBJECTIVE STATEMENT
1317 30 yr old WM c/o LLQ pain x 3 days associated with decreased appetite. Denies fever or chills. s/p testicular cancer 26 yrs ago with mets to spleen and stomach. In remission since then. A&O x 4. ambulatory. Color pink. skin W&D. abd soft. TTP LLQ. Denies dizziness, palp, cough, SOB, back pain, dysuria or exposure to COVID 19. 1317 30 yr old WM c/o LLQ pain x 3 days associated with decreased appetite. symptoms started after eating Chinese food 3 days ago. Last BM 2 days ago. Denies fever or chills. s/p testicular cancer 26 yrs ago with mets to spleen and stomach. In remission since then. Old scars visible on abd. A&O x 4. ambulatory. Color pink. skin W&D. abd soft. TTP LLQ. Denies dizziness, palp, cough, SOB, back pain, dysuria or exposure to COVID 19.

## 2021-01-30 NOTE — ED PROVIDER NOTE - OBJECTIVE STATEMENT
30y M hx of testicular CA with abdominal mets at child s/p testicular resection and chemotherapy comes to ED for L sided abdominal pain. L periumbilical pain x3 days, gradual onset, no radiation intermittent with episodes lasting seconds to hrs, better w/ hip flexion, no exacerbating factors. x1 episode of NBNB emesis 3 days ago, tolerating small amounts of PO since. Last BM 2 days ago normal. Denies f/c, cp, sob, dysuria or hematuria. Only surgical are testicular resection and ex-lap as a child. +marijunana, +social alcohol, -nsaid use

## 2021-01-30 NOTE — ED PROVIDER NOTE - CARE PROVIDER_API CALL
Ale Henao (MD; PhD)  Neurosurgery  [address], [address]  [place] [address]  Phone: [phone]  Fax: [phone]  Follow Up Time:

## 2021-01-30 NOTE — ED PROVIDER NOTE - PATIENT PORTAL LINK FT
You can access the FollowMyHealth Patient Portal offered by [facility] by registering at the following website: [email or web address]. By joining [facility]’s FollowMyHealth portal, you will also be able to view your health information using other applications (apps) compatible with our system.

## 2021-01-30 NOTE — ED ADULT TRIAGE NOTE - CHIEF COMPLAINT QUOTE
L sided abd pain x several days. +n/v. Pain constant w/ intermittent stabbing pains. Decreased PO intake. Denies sick contacts.  PMHx stomach CA has exploratory abd surgery during childhood

## 2021-01-30 NOTE — ED PROVIDER NOTE - PHYSICAL EXAMINATION
General: non-toxic, NAD  HEENT: NCAT, PERRL  Cardiac: RRR, no murmurs, 2+ peripheral pulses  Chest: CTA  Abdomen: soft, non-distended, bowel sounds present, +mild ttp L side, +mild ttp at mcbernies, Sedan negative, no cva, no guarding  Extremities: no peripheral edema  Skin: no rashes  Neuro: AAOx4, 5+motor, sensory grossly intact  Psych: mood and affect appropriate

## 2021-01-30 NOTE — ED PROVIDER NOTE - NS ED ROS FT
Constitutional: no fevers, chills  HEENT: no cough, rhinorrhea  Cardiac: no chest pain, palpitations  Respiratory: no SOB  GI: no bloody stools +abdominal pain +n/v  : no dysuria, frequency, or hematuria  MSK: no joint pain  Skin: no rashes  Neuro: no headache, change in vision, weakness  Psych: negative

## 2021-01-30 NOTE — ED PROVIDER NOTE - PROGRESS NOTE DETAILS
[place] DO: pt signed out to me hx abd surgeries, p/w LLQ abd pain, pain initially controlled, now c/o cramping LLQ pain, TTP, no rebound/ guarding, pt well appearing, will trial other meds, awaiting CT, pt updated on plan of care. MARYCARMEN Queen (Resident) - spine surgery recommended OP f/u and MRI for hemangioma. Pt's pain persists. Will give more toradol and reassess. MARYCARMEN Queen (Resident) - pt feels improved after bentyl. Will proceed with plan to dc with meds and GI/spine f/u.

## 2021-01-30 NOTE — ED PROVIDER NOTE - NSFOLLOWUPCLINICS_GEN_ALL_ED_FT
Gastroenterology at [facility]  Gastroenterology  [address] [place] [id number]  Phone: [phone]  Fax:   Follow Up Time:

## 2021-01-30 NOTE — ED ADULT NURSE REASSESSMENT NOTE - NS ED NURSE REASSESS COMMENT FT1
pt provided oral contrast. pt educated on how/ when to drink contrast as well as approx time for test. pt educated on importance of contrast as well as the timing of drinking contrast

## 2021-01-30 NOTE — ED PROVIDER NOTE - NSFOLLOWUPINSTRUCTIONS_ED_ALL_ED_FT
You were seen in the Emergency Department for abdominal pain. Your CT showed that you have a kidney stone that's not blocking anything. It also showed you do have an enlargement of a blood vessel in your spine and therefore should follow up with [facility]. You will need an outpatient MRI at this point. We would also like you to follow up with the GI doctor for this abdominal pain.    1) Advance activity as tolerated.   2) Continue all previously prescribed medications as directed.    3) Follow up with your primary care physician in 24-48 hours - take copies of your results.    4) Return to the Emergency Department for worsening or persistent symptoms, and/or ANY NEW OR CONCERNING SYMPTOMS. You were seen in the Emergency Department for abdominal pain. Your CT showed that you have a kidney stone that's not blocking anything. It also showed you do have an enlargement of a blood vessel in your spine and therefore should follow up with [facility]. You will need an outpatient MRI at this point. We would also like you to follow up with the GI doctor for this abdominal pain. For the abdominal pain you can continue taking bentyl OR ibuprofen, you can also take tylenol with the above meds.     1) Advance activity as tolerated.   2) Continue all previously prescribed medications as directed.    3) Follow up with your primary care physician in 24-48 hours - take copies of your results.    4) Return to the Emergency Department for worsening or persistent symptoms, and/or ANY NEW OR CONCERNING SYMPTOMS.

## 2021-01-30 NOTE — ED PROVIDER NOTE - ATTENDING CONTRIBUTION TO CARE
Patient presenting complaining of six days of L sided periumbilical abdominal pains, waxing/waning in intensity, no noted causes/triggering factors.  Had nausea initially with pain but not persisting.  No fevers or chills.  Prior abdominal surgery related to pediatric tumor resection.  No similar pains in past.  No dysuria/hematuria.    A 14 point review of systems is negative except as in HPI or otherwise documented.    Exam:  General: Patient well appearing, vital signs within normal limits  HEENT: airway patent with moist mucous membranes  Cardiac: RRR S1/S2 with strong peripheral pulses  Respiratory: lungs clear without respiratory distress  GI: abdomen soft, tender to palpation in LUQ/L periumbilical area without rebound/guarding, non distended  Neuro: no gross neurologic deficits  Skin: warm, well perfused  Psych: normal mood and affect    Patient presenting with L sided abdominal pains with point tenderness of unclear etiology.  Not obvious obstructed clinically, waxing/waning pain could be renal colic but no urinary symptoms.  Plan for screening labs/UA, treatment for gastritis and re-evaluation.    Labs/UA unremarkable, exam remains the same, no changes with gastritis treatment - will obtain CT A/P to further evaluate.

## 2021-04-13 ENCOUNTER — OUTPATIENT (OUTPATIENT)
Dept: OUTPATIENT SERVICES | Facility: HOSPITAL | Age: 31
LOS: 1 days | End: 2021-04-13
Payer: MEDICAID

## 2021-04-13 DIAGNOSIS — Z20.828 CONTACT WITH AND (SUSPECTED) EXPOSURE TO OTHER VIRAL COMMUNICABLE DISEASES: ICD-10-CM

## 2021-04-13 DIAGNOSIS — C62.90 MALIGNANT NEOPLASM OF UNSPECIFIED TESTIS, UNSPECIFIED WHETHER DESCENDED OR UNDESCENDED: Chronic | ICD-10-CM

## 2021-04-13 LAB — SARS-COV-2 RNA SPEC QL NAA+PROBE: SIGNIFICANT CHANGE UP

## 2021-04-13 PROCEDURE — U0005: CPT

## 2021-04-13 PROCEDURE — U0003: CPT

## 2021-04-13 PROCEDURE — C9803: CPT

## 2021-04-14 DIAGNOSIS — Z20.828 CONTACT WITH AND (SUSPECTED) EXPOSURE TO OTHER VIRAL COMMUNICABLE DISEASES: ICD-10-CM

## 2021-06-15 ENCOUNTER — TRANSCRIPTION ENCOUNTER (OUTPATIENT)
Age: 31
End: 2021-06-15

## 2021-10-25 ENCOUNTER — TRANSCRIPTION ENCOUNTER (OUTPATIENT)
Age: 31
End: 2021-10-25

## 2022-04-20 NOTE — ED ADULT NURSE NOTE - NSSEPSISSUSPECTED_ED_A_ED
Patient Quality Outreach    Patient is due for the following:   Colon Cancer Screening -  FIT    NEXT STEPS:   No follow up needed at this time.    Type of outreach:    Sent [employer] message.      Questions for provider review:    None     Bailee Kahler           No

## 2023-03-27 ENCOUNTER — INPATIENT (INPATIENT)
Facility: HOSPITAL | Age: 33
LOS: 4 days | Discharge: HOME | DRG: 866 | End: 2023-04-01
Attending: FAMILY MEDICINE | Admitting: STUDENT IN AN ORGANIZED HEALTH CARE EDUCATION/TRAINING PROGRAM
Payer: MEDICAID

## 2023-03-27 ENCOUNTER — TRANSCRIPTION ENCOUNTER (OUTPATIENT)
Age: 33
End: 2023-03-27

## 2023-03-27 VITALS
HEART RATE: 106 BPM | WEIGHT: 145.06 LBS | OXYGEN SATURATION: 96 % | RESPIRATION RATE: 18 BRPM | TEMPERATURE: 99 F | SYSTOLIC BLOOD PRESSURE: 126 MMHG | HEIGHT: 68 IN | DIASTOLIC BLOOD PRESSURE: 78 MMHG

## 2023-03-27 DIAGNOSIS — E78.5 HYPERLIPIDEMIA, UNSPECIFIED: ICD-10-CM

## 2023-03-27 DIAGNOSIS — C62.90 MALIGNANT NEOPLASM OF UNSPECIFIED TESTIS, UNSPECIFIED WHETHER DESCENDED OR UNDESCENDED: Chronic | ICD-10-CM

## 2023-03-27 DIAGNOSIS — R65.10 SYSTEMIC INFLAMMATORY RESPONSE SYNDROME (SIRS) OF NON-INFECTIOUS ORIGIN WITHOUT ACUTE ORGAN DYSFUNCTION: ICD-10-CM

## 2023-03-27 DIAGNOSIS — Z29.9 ENCOUNTER FOR PROPHYLACTIC MEASURES, UNSPECIFIED: ICD-10-CM

## 2023-03-27 DIAGNOSIS — D72.829 ELEVATED WHITE BLOOD CELL COUNT, UNSPECIFIED: ICD-10-CM

## 2023-03-27 DIAGNOSIS — N50.819 TESTICULAR PAIN, UNSPECIFIED: ICD-10-CM

## 2023-03-27 LAB
ALBUMIN SERPL ELPH-MCNC: 3.6 G/DL — SIGNIFICANT CHANGE UP (ref 3.3–5)
ALBUMIN SERPL ELPH-MCNC: 4.3 G/DL — SIGNIFICANT CHANGE UP (ref 3.3–5)
ALP SERPL-CCNC: 62 U/L — SIGNIFICANT CHANGE UP (ref 40–120)
ALP SERPL-CCNC: 67 U/L — SIGNIFICANT CHANGE UP (ref 40–120)
ALT FLD-CCNC: 32 U/L — SIGNIFICANT CHANGE UP (ref 12–78)
ALT FLD-CCNC: 68 U/L — SIGNIFICANT CHANGE UP (ref 12–78)
ANION GAP SERPL CALC-SCNC: 4 MMOL/L — LOW (ref 5–17)
ANION GAP SERPL CALC-SCNC: 4 MMOL/L — LOW (ref 5–17)
APPEARANCE UR: CLEAR — SIGNIFICANT CHANGE UP
APTT BLD: 39.3 SEC — HIGH (ref 27.5–35.5)
AST SERPL-CCNC: 23 U/L — SIGNIFICANT CHANGE UP (ref 15–37)
AST SERPL-CCNC: 78 U/L — HIGH (ref 15–37)
BACTERIA # UR AUTO: ABNORMAL
BASOPHILS # BLD AUTO: 0.02 K/UL — SIGNIFICANT CHANGE UP (ref 0–0.2)
BASOPHILS # BLD AUTO: 0.03 K/UL — SIGNIFICANT CHANGE UP (ref 0–0.2)
BASOPHILS NFR BLD AUTO: 0.1 % — SIGNIFICANT CHANGE UP (ref 0–2)
BASOPHILS NFR BLD AUTO: 0.2 % — SIGNIFICANT CHANGE UP (ref 0–2)
BILIRUB SERPL-MCNC: 0.9 MG/DL — SIGNIFICANT CHANGE UP (ref 0.2–1.2)
BILIRUB SERPL-MCNC: 1 MG/DL — SIGNIFICANT CHANGE UP (ref 0.2–1.2)
BILIRUB UR-MCNC: NEGATIVE — SIGNIFICANT CHANGE UP
BUN SERPL-MCNC: 12 MG/DL — SIGNIFICANT CHANGE UP (ref 7–23)
BUN SERPL-MCNC: 14 MG/DL — SIGNIFICANT CHANGE UP (ref 7–23)
CALCIUM SERPL-MCNC: 8.6 MG/DL — SIGNIFICANT CHANGE UP (ref 8.5–10.1)
CALCIUM SERPL-MCNC: 9.3 MG/DL — SIGNIFICANT CHANGE UP (ref 8.5–10.1)
CHLORIDE SERPL-SCNC: 106 MMOL/L — SIGNIFICANT CHANGE UP (ref 96–108)
CHLORIDE SERPL-SCNC: 107 MMOL/L — SIGNIFICANT CHANGE UP (ref 96–108)
CO2 SERPL-SCNC: 26 MMOL/L — SIGNIFICANT CHANGE UP (ref 22–31)
CO2 SERPL-SCNC: 28 MMOL/L — SIGNIFICANT CHANGE UP (ref 22–31)
COLOR SPEC: YELLOW — SIGNIFICANT CHANGE UP
CREAT SERPL-MCNC: 0.99 MG/DL — SIGNIFICANT CHANGE UP (ref 0.5–1.3)
CREAT SERPL-MCNC: 1.2 MG/DL — SIGNIFICANT CHANGE UP (ref 0.5–1.3)
DIFF PNL FLD: ABNORMAL
EGFR: 103 ML/MIN/1.73M2 — SIGNIFICANT CHANGE UP
EGFR: 82 ML/MIN/1.73M2 — SIGNIFICANT CHANGE UP
EOSINOPHIL # BLD AUTO: 0 K/UL — SIGNIFICANT CHANGE UP (ref 0–0.5)
EOSINOPHIL # BLD AUTO: 0.01 K/UL — SIGNIFICANT CHANGE UP (ref 0–0.5)
EOSINOPHIL NFR BLD AUTO: 0 % — SIGNIFICANT CHANGE UP (ref 0–6)
EOSINOPHIL NFR BLD AUTO: 0.1 % — SIGNIFICANT CHANGE UP (ref 0–6)
EPI CELLS # UR: NEGATIVE — SIGNIFICANT CHANGE UP
ERYTHROCYTE [SEDIMENTATION RATE] IN BLOOD: 25 MM/HR — HIGH (ref 0–15)
GLUCOSE SERPL-MCNC: 110 MG/DL — HIGH (ref 70–99)
GLUCOSE SERPL-MCNC: 137 MG/DL — HIGH (ref 70–99)
GLUCOSE UR QL: NEGATIVE — SIGNIFICANT CHANGE UP
HCT VFR BLD CALC: 40.1 % — SIGNIFICANT CHANGE UP (ref 39–50)
HCT VFR BLD CALC: 42.8 % — SIGNIFICANT CHANGE UP (ref 39–50)
HGB BLD-MCNC: 13.8 G/DL — SIGNIFICANT CHANGE UP (ref 13–17)
HGB BLD-MCNC: 14.6 G/DL — SIGNIFICANT CHANGE UP (ref 13–17)
IMM GRANULOCYTES NFR BLD AUTO: 0.4 % — SIGNIFICANT CHANGE UP (ref 0–0.9)
IMM GRANULOCYTES NFR BLD AUTO: 0.5 % — SIGNIFICANT CHANGE UP (ref 0–0.9)
INR BLD: 1.15 RATIO — SIGNIFICANT CHANGE UP (ref 0.88–1.16)
KETONES UR-MCNC: ABNORMAL
LACTATE SERPL-SCNC: 0.7 MMOL/L — SIGNIFICANT CHANGE UP (ref 0.7–2)
LEUKOCYTE ESTERASE UR-ACNC: NEGATIVE — SIGNIFICANT CHANGE UP
LYMPHOCYTES # BLD AUTO: 0.99 K/UL — LOW (ref 1–3.3)
LYMPHOCYTES # BLD AUTO: 0.99 K/UL — LOW (ref 1–3.3)
LYMPHOCYTES # BLD AUTO: 5.2 % — LOW (ref 13–44)
LYMPHOCYTES # BLD AUTO: 5.6 % — LOW (ref 13–44)
MCHC RBC-ENTMCNC: 29 PG — SIGNIFICANT CHANGE UP (ref 27–34)
MCHC RBC-ENTMCNC: 29.9 PG — SIGNIFICANT CHANGE UP (ref 27–34)
MCHC RBC-ENTMCNC: 34.1 GM/DL — SIGNIFICANT CHANGE UP (ref 32–36)
MCHC RBC-ENTMCNC: 34.4 GM/DL — SIGNIFICANT CHANGE UP (ref 32–36)
MCV RBC AUTO: 84.9 FL — SIGNIFICANT CHANGE UP (ref 80–100)
MCV RBC AUTO: 86.8 FL — SIGNIFICANT CHANGE UP (ref 80–100)
MONOCYTES # BLD AUTO: 0.89 K/UL — SIGNIFICANT CHANGE UP (ref 0–0.9)
MONOCYTES # BLD AUTO: 0.98 K/UL — HIGH (ref 0–0.9)
MONOCYTES NFR BLD AUTO: 5.1 % — SIGNIFICANT CHANGE UP (ref 2–14)
MONOCYTES NFR BLD AUTO: 5.1 % — SIGNIFICANT CHANGE UP (ref 2–14)
NEUTROPHILS # BLD AUTO: 15.63 K/UL — HIGH (ref 1.8–7.4)
NEUTROPHILS # BLD AUTO: 17.04 K/UL — HIGH (ref 1.8–7.4)
NEUTROPHILS NFR BLD AUTO: 88.7 % — HIGH (ref 43–77)
NEUTROPHILS NFR BLD AUTO: 89 % — HIGH (ref 43–77)
NITRITE UR-MCNC: NEGATIVE — SIGNIFICANT CHANGE UP
NRBC # BLD: 0 /100 WBCS — SIGNIFICANT CHANGE UP (ref 0–0)
NRBC # BLD: 0 /100 WBCS — SIGNIFICANT CHANGE UP (ref 0–0)
PH UR: 5 — SIGNIFICANT CHANGE UP (ref 5–8)
PLATELET # BLD AUTO: 158 K/UL — SIGNIFICANT CHANGE UP (ref 150–400)
PLATELET # BLD AUTO: 187 K/UL — SIGNIFICANT CHANGE UP (ref 150–400)
POTASSIUM SERPL-MCNC: 3.9 MMOL/L — SIGNIFICANT CHANGE UP (ref 3.5–5.3)
POTASSIUM SERPL-MCNC: 4 MMOL/L — SIGNIFICANT CHANGE UP (ref 3.5–5.3)
POTASSIUM SERPL-SCNC: 3.9 MMOL/L — SIGNIFICANT CHANGE UP (ref 3.5–5.3)
POTASSIUM SERPL-SCNC: 4 MMOL/L — SIGNIFICANT CHANGE UP (ref 3.5–5.3)
PROT SERPL-MCNC: 7.4 G/DL — SIGNIFICANT CHANGE UP (ref 6–8.3)
PROT SERPL-MCNC: 8.2 G/DL — SIGNIFICANT CHANGE UP (ref 6–8.3)
PROT UR-MCNC: NEGATIVE — SIGNIFICANT CHANGE UP
PROTHROM AB SERPL-ACNC: 13.5 SEC — HIGH (ref 10.5–13.4)
RAPID RVP RESULT: SIGNIFICANT CHANGE UP
RBC # BLD: 4.62 M/UL — SIGNIFICANT CHANGE UP (ref 4.2–5.8)
RBC # BLD: 5.04 M/UL — SIGNIFICANT CHANGE UP (ref 4.2–5.8)
RBC # FLD: 12.7 % — SIGNIFICANT CHANGE UP (ref 10.3–14.5)
RBC # FLD: 12.8 % — SIGNIFICANT CHANGE UP (ref 10.3–14.5)
RBC CASTS # UR COMP ASSIST: SIGNIFICANT CHANGE UP /HPF (ref 0–4)
SARS-COV-2 RNA SPEC QL NAA+PROBE: SIGNIFICANT CHANGE UP
SODIUM SERPL-SCNC: 137 MMOL/L — SIGNIFICANT CHANGE UP (ref 135–145)
SODIUM SERPL-SCNC: 138 MMOL/L — SIGNIFICANT CHANGE UP (ref 135–145)
SP GR SPEC: 1.02 — SIGNIFICANT CHANGE UP (ref 1.01–1.02)
UROBILINOGEN FLD QL: NEGATIVE — SIGNIFICANT CHANGE UP
WBC # BLD: 17.61 K/UL — HIGH (ref 3.8–10.5)
WBC # BLD: 19.13 K/UL — HIGH (ref 3.8–10.5)
WBC # FLD AUTO: 17.61 K/UL — HIGH (ref 3.8–10.5)
WBC # FLD AUTO: 19.13 K/UL — HIGH (ref 3.8–10.5)
WBC UR QL: SIGNIFICANT CHANGE UP

## 2023-03-27 PROCEDURE — 70450 CT HEAD/BRAIN W/O DYE: CPT | Mod: 26,MA

## 2023-03-27 PROCEDURE — 74177 CT ABD & PELVIS W/CONTRAST: CPT | Mod: 26,MA

## 2023-03-27 PROCEDURE — 99221 1ST HOSP IP/OBS SF/LOW 40: CPT

## 2023-03-27 PROCEDURE — 99285 EMERGENCY DEPT VISIT HI MDM: CPT

## 2023-03-27 PROCEDURE — 71260 CT THORAX DX C+: CPT | Mod: 26,MA

## 2023-03-27 PROCEDURE — 99223 1ST HOSP IP/OBS HIGH 75: CPT | Mod: GC

## 2023-03-27 PROCEDURE — 93975 VASCULAR STUDY: CPT | Mod: 26

## 2023-03-27 PROCEDURE — 72100 X-RAY EXAM L-S SPINE 2/3 VWS: CPT | Mod: 26

## 2023-03-27 PROCEDURE — 71045 X-RAY EXAM CHEST 1 VIEW: CPT | Mod: 26

## 2023-03-27 PROCEDURE — 70551 MRI BRAIN STEM W/O DYE: CPT | Mod: 26

## 2023-03-27 PROCEDURE — 76870 US EXAM SCROTUM: CPT | Mod: 26

## 2023-03-27 PROCEDURE — 72158 MRI LUMBAR SPINE W/O & W/DYE: CPT | Mod: 26

## 2023-03-27 RX ORDER — OXYCODONE HYDROCHLORIDE 5 MG/1
5 TABLET ORAL EVERY 6 HOURS
Refills: 0 | Status: DISCONTINUED | OUTPATIENT
Start: 2023-03-27 | End: 2023-03-29

## 2023-03-27 RX ORDER — VANCOMYCIN HCL 1 G
1000 VIAL (EA) INTRAVENOUS EVERY 12 HOURS
Refills: 0 | Status: DISCONTINUED | OUTPATIENT
Start: 2023-03-27 | End: 2023-03-29

## 2023-03-27 RX ORDER — CEFTRIAXONE 500 MG/1
1000 INJECTION, POWDER, FOR SOLUTION INTRAMUSCULAR; INTRAVENOUS EVERY 24 HOURS
Refills: 0 | Status: DISCONTINUED | OUTPATIENT
Start: 2023-03-28 | End: 2023-03-27

## 2023-03-27 RX ORDER — CEFTRIAXONE 500 MG/1
2000 INJECTION, POWDER, FOR SOLUTION INTRAMUSCULAR; INTRAVENOUS EVERY 12 HOURS
Refills: 0 | Status: DISCONTINUED | OUTPATIENT
Start: 2023-03-27 | End: 2023-03-29

## 2023-03-27 RX ORDER — SODIUM CHLORIDE 9 MG/ML
1000 INJECTION INTRAMUSCULAR; INTRAVENOUS; SUBCUTANEOUS ONCE
Refills: 0 | Status: COMPLETED | OUTPATIENT
Start: 2023-03-27 | End: 2023-03-27

## 2023-03-27 RX ORDER — VANCOMYCIN HCL 1 G
VIAL (EA) INTRAVENOUS
Refills: 0 | Status: DISCONTINUED | OUTPATIENT
Start: 2023-03-27 | End: 2023-03-27

## 2023-03-27 RX ORDER — ATORVASTATIN CALCIUM 80 MG/1
40 TABLET, FILM COATED ORAL AT BEDTIME
Refills: 0 | Status: DISCONTINUED | OUTPATIENT
Start: 2023-03-27 | End: 2023-04-01

## 2023-03-27 RX ORDER — LANOLIN ALCOHOL/MO/W.PET/CERES
3 CREAM (GRAM) TOPICAL AT BEDTIME
Refills: 0 | Status: DISCONTINUED | OUTPATIENT
Start: 2023-03-27 | End: 2023-04-01

## 2023-03-27 RX ORDER — CEFTRIAXONE 500 MG/1
2000 INJECTION, POWDER, FOR SOLUTION INTRAMUSCULAR; INTRAVENOUS ONCE
Refills: 0 | Status: COMPLETED | OUTPATIENT
Start: 2023-03-27 | End: 2023-03-27

## 2023-03-27 RX ORDER — ONDANSETRON 8 MG/1
4 TABLET, FILM COATED ORAL ONCE
Refills: 0 | Status: COMPLETED | OUTPATIENT
Start: 2023-03-27 | End: 2023-03-27

## 2023-03-27 RX ORDER — ACETAMINOPHEN 500 MG
1000 TABLET ORAL ONCE
Refills: 0 | Status: COMPLETED | OUTPATIENT
Start: 2023-03-27 | End: 2023-03-27

## 2023-03-27 RX ORDER — KETOROLAC TROMETHAMINE 30 MG/ML
30 SYRINGE (ML) INJECTION ONCE
Refills: 0 | Status: DISCONTINUED | OUTPATIENT
Start: 2023-03-27 | End: 2023-03-27

## 2023-03-27 RX ORDER — ACETAMINOPHEN 500 MG
650 TABLET ORAL EVERY 6 HOURS
Refills: 0 | Status: DISCONTINUED | OUTPATIENT
Start: 2023-03-27 | End: 2023-04-01

## 2023-03-27 RX ORDER — LISDEXAMFETAMINE DIMESYLATE 70 MG/1
1 CAPSULE ORAL
Refills: 0 | DISCHARGE

## 2023-03-27 RX ORDER — ROSUVASTATIN CALCIUM 5 MG/1
1 TABLET ORAL
Refills: 0 | DISCHARGE

## 2023-03-27 RX ORDER — MORPHINE SULFATE 50 MG/1
4 CAPSULE, EXTENDED RELEASE ORAL ONCE
Refills: 0 | Status: DISCONTINUED | OUTPATIENT
Start: 2023-03-27 | End: 2023-03-27

## 2023-03-27 RX ORDER — FINASTERIDE 5 MG/1
5 TABLET, FILM COATED ORAL DAILY
Refills: 0 | Status: DISCONTINUED | OUTPATIENT
Start: 2023-03-27 | End: 2023-04-01

## 2023-03-27 RX ORDER — FINASTERIDE 5 MG/1
1 TABLET, FILM COATED ORAL
Refills: 0 | DISCHARGE

## 2023-03-27 RX ORDER — FAMOTIDINE 10 MG/ML
1 INJECTION INTRAVENOUS
Refills: 0 | DISCHARGE

## 2023-03-27 RX ORDER — ONDANSETRON 8 MG/1
4 TABLET, FILM COATED ORAL EVERY 8 HOURS
Refills: 0 | Status: DISCONTINUED | OUTPATIENT
Start: 2023-03-27 | End: 2023-04-01

## 2023-03-27 RX ORDER — FAMOTIDINE 10 MG/ML
20 INJECTION INTRAVENOUS DAILY
Refills: 0 | Status: DISCONTINUED | OUTPATIENT
Start: 2023-03-27 | End: 2023-04-01

## 2023-03-27 RX ORDER — MORPHINE SULFATE 50 MG/1
2 CAPSULE, EXTENDED RELEASE ORAL EVERY 6 HOURS
Refills: 0 | Status: DISCONTINUED | OUTPATIENT
Start: 2023-03-27 | End: 2023-03-29

## 2023-03-27 RX ADMIN — MORPHINE SULFATE 2 MILLIGRAM(S): 50 CAPSULE, EXTENDED RELEASE ORAL at 06:31

## 2023-03-27 RX ADMIN — Medication 30 MILLIGRAM(S): at 06:05

## 2023-03-27 RX ADMIN — CEFTRIAXONE 100 MILLIGRAM(S): 500 INJECTION, POWDER, FOR SOLUTION INTRAMUSCULAR; INTRAVENOUS at 17:13

## 2023-03-27 RX ADMIN — MORPHINE SULFATE 2 MILLIGRAM(S): 50 CAPSULE, EXTENDED RELEASE ORAL at 17:35

## 2023-03-27 RX ADMIN — Medication 30 MILLIGRAM(S): at 09:14

## 2023-03-27 RX ADMIN — ATORVASTATIN CALCIUM 40 MILLIGRAM(S): 80 TABLET, FILM COATED ORAL at 22:27

## 2023-03-27 RX ADMIN — OXYCODONE HYDROCHLORIDE 5 MILLIGRAM(S): 5 TABLET ORAL at 23:33

## 2023-03-27 RX ADMIN — Medication 250 MILLIGRAM(S): at 18:13

## 2023-03-27 RX ADMIN — FINASTERIDE 5 MILLIGRAM(S): 5 TABLET, FILM COATED ORAL at 11:43

## 2023-03-27 RX ADMIN — SODIUM CHLORIDE 1000 MILLILITER(S): 9 INJECTION INTRAMUSCULAR; INTRAVENOUS; SUBCUTANEOUS at 02:24

## 2023-03-27 RX ADMIN — FAMOTIDINE 20 MILLIGRAM(S): 10 INJECTION INTRAVENOUS at 11:42

## 2023-03-27 RX ADMIN — CEFTRIAXONE 100 MILLIGRAM(S): 500 INJECTION, POWDER, FOR SOLUTION INTRAMUSCULAR; INTRAVENOUS at 04:19

## 2023-03-27 RX ADMIN — Medication 250 MILLIGRAM(S): at 06:46

## 2023-03-27 RX ADMIN — MORPHINE SULFATE 4 MILLIGRAM(S): 50 CAPSULE, EXTENDED RELEASE ORAL at 04:59

## 2023-03-27 RX ADMIN — Medication 1000 MILLIGRAM(S): at 02:55

## 2023-03-27 RX ADMIN — Medication 1000 MILLIGRAM(S): at 02:40

## 2023-03-27 RX ADMIN — MORPHINE SULFATE 4 MILLIGRAM(S): 50 CAPSULE, EXTENDED RELEASE ORAL at 04:14

## 2023-03-27 RX ADMIN — OXYCODONE HYDROCHLORIDE 5 MILLIGRAM(S): 5 TABLET ORAL at 11:43

## 2023-03-27 RX ADMIN — ONDANSETRON 4 MILLIGRAM(S): 8 TABLET, FILM COATED ORAL at 05:07

## 2023-03-27 RX ADMIN — MORPHINE SULFATE 2 MILLIGRAM(S): 50 CAPSULE, EXTENDED RELEASE ORAL at 17:20

## 2023-03-27 RX ADMIN — CEFTRIAXONE 2000 MILLIGRAM(S): 500 INJECTION, POWDER, FOR SOLUTION INTRAMUSCULAR; INTRAVENOUS at 04:57

## 2023-03-27 RX ADMIN — MORPHINE SULFATE 2 MILLIGRAM(S): 50 CAPSULE, EXTENDED RELEASE ORAL at 09:14

## 2023-03-27 RX ADMIN — Medication 400 MILLIGRAM(S): at 02:25

## 2023-03-27 NOTE — CONSULT NOTE ADULT - SUBJECTIVE AND OBJECTIVE BOX
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 He says he had had testicular pain since 2022, and has been seeing his urologist. Over the past 1 week he developed low back pain. And the past 2 days he developed frontal headache as well as subjective fevers. He also complains of metallic taste in mouth, lower abdominal pain, weakness. He denies any bowel/bladder incontinence. He says he has been ambulating to the bathroom here.    Patient denies any sick contacts or recent travel. He works from home and denies any hiking/outdoor activities. He had liposuction of his legs and stem cell surgery (for his chronic pain?) procedure in [facility] in 2020. He also reports hair transplant done last year. He occasionally smokes marijuana but otherwise denies other drug use, or any cigarette smoking or alcohol intake.      PAST MEDICAL & SURGICAL HISTORY:  Testicular cancer      Kidney stone      Cancer of testicle  at 4 yrs old with mets to spleen and stomach. No reoccurences          Social Hx:     FAMILY HISTORY:  FH: type 2 diabetes (Father)        Allergies    No Known Allergies    Intolerances        Antibiotics:  MEDICATIONS  (STANDING):  atorvastatin 40 milliGRAM(s) Oral at bedtime  cefTRIAXone   IVPB 2000 milliGRAM(s) IV Intermittent every 12 hours  famotidine    Tablet 20 milliGRAM(s) Oral daily  finasteride 5 milliGRAM(s) Oral daily  vancomycin  IVPB 1000 milliGRAM(s) IV Intermittent every 12 hours    MEDICATIONS  (PRN):  acetaminophen     Tablet .. 650 milliGRAM(s) Oral every 6 hours PRN Temp greater or equal to 38C (100.4F), Mild Pain (1 - 3)  aluminum hydroxide/magnesium hydroxide/simethicone Suspension 30 milliLiter(s) Oral every 4 hours PRN Dyspepsia  melatonin 3 milliGRAM(s) Oral at bedtime PRN Insomnia  morphine  - Injectable 2 milliGRAM(s) IV Push every 6 hours PRN Severe Pain (7 - 10)  ondansetron Injectable 4 milliGRAM(s) IV Push every 8 hours PRN Nausea and/or Vomiting  oxyCODONE    IR 5 milliGRAM(s) Oral every 6 hours PRN Moderate Pain (4 - 6)       REVIEW OF SYSTEMS:  CONSTITUTIONAL:  (+) subjective fevers  HEENT:  No sore throat or runny nose.  CARDIOVASCULAR:  No chest pain or SOB.  RESPIRATORY:  No cough, shortness of breath  GASTROINTESTINAL:  No nausea, vomiting or diarrhea.  GENITOURINARY:  No dysuria, frequency or urgency  MUSCULOSKELETAL:  see history  SKIN:  (+) hypopigmented rash on R upper arm  NEUROLOGIC: see history  PSYCHIATRIC:  No disorder of thought or mood.    Physical Exam:  Vital Signs Last 24 Hrs  T(C): 36.9 (27 Mar 2023 09:10), Max: 37.3 (27 Mar 2023 01:24)  T(F): 98.4 (27 Mar 2023 09:10), Max: 99.2 (27 Mar 2023 01:24)  HR: 94 (27 Mar 2023 09:10) (90 - 106)  BP: 106/60 (27 Mar 2023 09:10) (106/60 - 126/78)  BP(mean): --  RR: 18 (27 Mar 2023 09:10) (18 - 18)  SpO2: 97% (27 Mar 2023 09:10) (96% - 100%)    Parameters below as of 27 Mar 2023 09:10  Patient On (Oxygen Delivery Method): room air      Height (cm): 172.7 ( @ 01:24)  Weight (kg): 65.8 ( @ 01:24)  BMI (kg/m2): 22.1 ( @ 01:24)  BSA (m2): 1.78 ( @ 01:24)  GEN: NAD  HEENT: normocephalic and atraumatic.   NECK: Supple.   LUNGS: Clear to auscultation.  HEART: Regular rate and rhythm   ABDOMEN: Soft, nontender, and nondistended.    MUSCULOSKELETAL: No spinal tenderness  EXTREMITIES: No leg edema. no knee swelling  NEUROLOGIC: AO x 3, no photophobia, no nuchal rigidity  PSYCHIATRIC: Appropriate affect .      Labs:      137  |  107  |  12  ----------------------------<  137<H>  4.0   |  26  |  0.99    Ca    8.6      27 Mar 2023 07:00    TPro  7.4  /  Alb  3.6  /  TBili  1.0  /  DBili  x   /  AST  78<H>  /  ALT  68  /  AlkPhos  62                            13.8   17.61 )-----------( 158      ( 27 Mar 2023 07:00 )             40.1     PT/INR - ( 27 Mar 2023 02:15 )   PT: 13.5 sec;   INR: 1.15 ratio         PTT - ( 27 Mar 2023 02:15 )  PTT:39.3 sec  Urinalysis Basic - ( 27 Mar 2023 04:13 )    Color: Yellow / Appearance: Clear / S.020 / pH: x  Gluc: x / Ketone: Moderate  / Bili: Negative / Urobili: Negative   Blood: x / Protein: Negative / Nitrite: Negative   Leuk Esterase: Negative / RBC: 0-2 /HPF / WBC 0-2   Sq Epi: x / Non Sq Epi: Negative / Bacteria: Occasional      LIVER FUNCTIONS - ( 27 Mar 2023 07:00 )  Alb: 3.6 g/dL / Pro: 7.4 g/dL / ALK PHOS: 62 U/L / ALT: 68 U/L / AST: 78 U/L / GGT: x                       SARS-CoV-2: NotDetec (23 @ 02:49)      RECENT CULTURES:   @ 02:49          NotDetec        All imaging and other data have been reviewed.    CT chest and A/P:  FINDINGS:  CHEST:  LUNGS AND LARGE AIRWAYS: Patent central airways. 3 mm nodule in the right   middle lobe (series 4, image 122). No pulmonary masses or consolidation.   Lingular cyst. Bilateral lower lobe dependent atelectasis. Elevation of   the right hemidiaphragm.  PLEURA: No pleural effusion.  VESSELS: Within normal limits.  HEART: Heart size is normal. No pericardial effusion.  MEDIASTINUM AND GLADIS: No lymphadenopathy.  CHEST WALL AND LOWER NECK: Mild bilateral gynecomastia.    ABDOMEN AND PELVIS:  LIVER: Within normal limits.  BILE DUCTS: Normal caliber.  GALLBLADDER: Within normal limits.  SPLEEN: Within normal limits.  PANCREAS: Within normal limits.  ADRENALS: Within normal limits.  KIDNEYS/URETERS: Nonobstructing 3 mm left renal calculus. Left renal   subcentimeter hypodense lesions which are too small for accurate   characterization. No hydronephrosis. Duplicated left renal collecting   system. Right kidney within normal limits.    BLADDER: Underdistended. Circumferential wall thickening.  REPRODUCTIVE ORGANS: Prostate within normal limits.    BOWEL: No bowel obstruction or inflammation. Appendix is normal.   Distended fluid-filled loops of small bowel in the central abdomen.  PERITONEUM: No ascites.  VESSELS: Within normal limits.  RETROPERITONEUM/LYMPH NODES: No lymphadenopathy.  ABDOMINAL WALL: Within normal limits.  BONES: L5 vertebral body hemangioma.    IMPRESSION:  1. No pulmonary consolidation.  2. No bowel obstruction or inflammation. Distended fluid-filled loops of   small bowel in the central abdomen which is nonspecific, but could be   seen in the setting of a gastroenteritis.  3. Circumferential urinary bladder wall thickening which could be due to   underdistention versus a cystitis. Correlate with urinalysis.      MRI head:  Findings:    There is no acute infarct. There is no evidence of mass or intracranial   hemorrhage. Ventricles and sulci are normal in size and configuration for   the patient's stated age. No midline shift or other significant mass   effect is noted. Gray-white differentiation is maintained throughout.   Normal flow voids are maintained in the dominant arterial and venous   structures.    The paranasal sinuses and tympanomastoid spaces are clear. Orbits and   orbital contents are unremarkable.    IMPRESSION:    No acute intracranial abnormality.      US scrotum and testicle:  FINDINGS:    RIGHT:  Right testis: 4.8 cm x 1.8 cm x 3.4 cm. Normal echogenicity and   echotexture with no masses or areas of architectural distortion. Normal   arterial and venous blood flow pattern.  Right epididymis: Within normal limits.  Right hydrocele: Small.  Right varicocele: None.    LEFT:  Left testis/epididymis: Surgically absent.    IMPRESSION:    No testicular torsion.

## 2023-03-27 NOTE — ED ADULT NURSE REASSESSMENT NOTE - NS ED NURSE REASSESS COMMENT FT1
pt aomahendra, r report gotten  from ALVINO Monsivais
Patient is sleeping at this time awaiting for Cat scan results.

## 2023-03-27 NOTE — H&P ADULT - PROBLEM SELECTOR PLAN 1
Patient met SIRS criteria on admission: leukocytosis and tachycardia  - etiology of SIRS unclear; differential includes cystitis vs gastroenteritis vs meningitis?  - will cover broadly with vancomycin and rocephin  - ID consult Dr. Whitney f/u recs Patient met SIRS criteria on admission: leukocytosis and tachycardia  - CT head: unremarkable  - CT chest: No pulmonary consolidation.  - CT abdomen: No bowel obstruction or inflammation. Distended fluid-filled loops of small bowel in the central abdomen which is nonspecific, but could be seen in the setting of a gastroenteritis. Circumferential urinary bladder wall thickening which could be due to underdistention versus a cystitis.   - UA: occasional bacteria, no nitrites, no wbcs,   - CXR: no acute infiltrates or consolidations on wet read  - RVP/COVID negative  - etiology of SIRS unclear; differential includes cystitis vs gastroenteritis vs meningitis?  - will cover broadly with vancomycin and rocephin  - ID consult Dr. Whitney f/u recs Patient met SIRS criteria on admission: leukocytosis and tachycardia  - Patient w/ multiple nonspecific complaints including testicular pain for the past few weeks now progressed to LBP along with HA, weakness, photophobia, cold sweats, chills, abdominal pain   - CT head: unremarkable  - CT chest: No pulmonary consolidation.  - CT abdomen: No bowel obstruction or inflammation. Distended fluid-filled loops of small bowel in the central abdomen which is nonspecific, but could be seen in the setting of a gastroenteritis. Circumferential urinary bladder wall thickening which could be due to underdistention versus a cystitis.   - UA: occasional bacteria, no nitrites, no wbcs,   - CXR: no acute infiltrates or consolidations on wet read  - RVP/COVID negative  - etiology of SIRS unclear; differential includes epididmytis vs cystitis vs gastroenteritis vs less likely meningitis  - will cover broadly with vancomycin and rocephin  - f/u testicular US   - ID consult Dr. Whitney f/u recs Patient met SIRS criteria on admission: leukocytosis and tachycardia  - Patient w/ multiple nonspecific complaints including testicular pain for the past few weeks now progressed to LBP along with HA, weakness, photophobia, cold sweats, chills, abdominal pain   - CT head: unremarkable  - CT chest: No pulmonary consolidation.  - CT abdomen: No bowel obstruction or inflammation. Distended fluid-filled loops of small bowel in the central abdomen which is nonspecific, but could be seen in the setting of a gastroenteritis. Circumferential urinary bladder wall thickening which could be due to underdistention versus a cystitis.   - UA: occasional bacteria, no nitrites, no wbcs,   - CXR: no acute infiltrates or consolidations on wet read  - RVP/COVID negative  - etiology of SIRS unclear; differential includes epididmytis vs cystitis vs gastroenteritis vs meningitis  - patient refused LP in the ed   - will cover broadly with vancomycin and rocephin  - f/u testicular US   - fu cultures   - ID consult Dr. Whitney f/u recs Patient met SIRS criteria on admission: leukocytosis and tachycardia  - Patient w/ multiple nonspecific complaints including testicular pain for the past few weeks now progressed to LBP along with HA, weakness, photophobia, cold sweats, chills, abdominal pain   - CT head: unremarkable  - CT chest: No pulmonary consolidation.  - CT abdomen: No bowel obstruction or inflammation. Distended fluid-filled loops of small bowel in the central abdomen which is nonspecific, but could be seen in the setting of a gastroenteritis. Circumferential urinary bladder wall thickening which could be due to underdistention versus a cystitis.   - UA: occasional bacteria, no nitrites, no wbcs,   - CXR: no acute infiltrates or consolidations on wet read  - RVP/COVID negative  - etiology of SIRS unclear; differential includes epididmytis vs cystitis vs gastroenteritis vs meningitis  - patient refused LP in the ed   - will cover broadly with vancomycin and rocephin  - pain medications as ordered  - f/u testicular US   - fu cultures   - ID consult Dr. Whitney f/u recs Patient met SIRS criteria on admission: leukocytosis and tachycardia  - Patient w/ multiple nonspecific complaints including testicular pain for the past few weeks now progressed to LBP along with HA, weakness, photophobia, cold sweats, chills, abdominal pain   - CT head: unremarkable  - CT chest: No pulmonary consolidation.  - CT abdomen: No bowel obstruction or inflammation. Distended fluid-filled loops of small bowel in the central abdomen which is nonspecific, but could be seen in the setting of a gastroenteritis. Circumferential urinary bladder wall thickening which could be due to underdistention versus a cystitis.   - UA: occasional bacteria, no nitrites, no wbcs,   - CXR: no acute infiltrates or consolidations on wet read  - RVP/COVID negative  - etiology of SIRS unclear; differential includes epididmytis vs cystitis vs gastroenteritis vs meningitis  - patient refused LP in the ed   - will cover broadly with vancomycin and rocephin  - pain medications as ordered  - f/u testicular US   - fu cultures   - FU mri head   - ID consult Dr. Whitney f/u recs

## 2023-03-27 NOTE — H&P ADULT - ASSESSMENT
ED Course  Vitals: 99.2F, 106bpm, 126/78, 96%  RA  Labs: WBC 19, CMP wnl  CT head: unremarkable  CT chest: No pulmonary consolidation.  CT abdomen: No bowel obstruction or inflammation. Distended fluid-filled loops of small bowel in the central abdomen which is nonspecific, but could be seen in the setting of a gastroenteritis.Circumferential urinary bladder wall thickening which could be due to underdistention versus a cystitis. Correlate with urinalysis.  RVP/COVID negative    Received in ED: Morphine 4mg IV x1, Rocephin 2g IV x1, Ofirmev x1, Zofran 4mg IV x1   32y M pmhx renal stones, testicular cancer, presented to the ED c/o headache and low back pain. Patients states he hasn't been "feeling right" for the past few weeks and was considering coming to the hospital.  32y M pmhx renal stones, testicular cancer, presented to the ED c/o headache and low back pain. Patients states he hasn't been "feeling right" for the past few weeks and was considering coming to the hospital. Admitted for sepsis with unclear etiology

## 2023-03-27 NOTE — H&P ADULT - PROBLEM SELECTOR PLAN 2
- Hx of testicular cancer s/p a multitude of surgical procedures as a child, now in remission   - Patient has hx of stem cell scar revision surgery with thigh fat liposuction in [facility] in 2020 with subsequent symptoms of testicular/back pain   - F/u testicular US; previous US in 1/2023 negative   - C/w home medication Finasteride   - F/u ID consult  - Can consider Urology consult pending testicular US results

## 2023-03-27 NOTE — ED ADULT NURSE NOTE - NS ED NURSE REPORT GIVEN DT
Is he waking up due to pain ? ? According to the [address] [place] [address], he would be taking mirtazapine, oxycodone and methocarbamol at bedtime   Right ?? 27-Mar-2023 07:25 27-Mar-2023 15:12

## 2023-03-27 NOTE — H&P ADULT - HISTORY OF PRESENT ILLNESS
32y M pmhx renal stones, testicular cancer, presented to the ED c/o headache and low back x 2 days associated with fever and chills. Denies headache, dizziness, cp, sob, abd pain, n/v/d.    ED Course  Vitals: 99.2F, 106bpm, 126/78, 96%  RA  Labs: WBC 19, CMP wnl  CT head: unremarkable  CT chest: No pulmonary consolidation.  CT abdomen: No bowel obstruction or inflammation. Distended fluid-filled loops of small bowel in the central abdomen which is nonspecific, but could be seen in the setting of a gastroenteritis.Circumferential urinary bladder wall thickening which could be due to underdistention versus a cystitis. Correlate with urinalysis.  RVP/COVID negative    Received in ED: Morphine 4mg IV x1, Rocephin 2g IV x1, Ofirmev x1, Zofran 4mg IV x1 32y M pmhx renal stones, testicular cancer, presented to the ED c/o headache and low back x 2 days associated with fever and chills. Denies headache, dizziness, cp, sob, abd pain, n/v/d.    ED Course  Vitals: 99.2F, 106bpm, 126/78, 96%  RA  Labs: WBC 19, CMP wnl  CT head: unremarkable  CT chest: No pulmonary consolidation.  CT abdomen: No bowel obstruction or inflammation. Distended fluid-filled loops of small bowel in the central abdomen which is nonspecific, but could be seen in the setting of a gastroenteritis. Circumferential urinary bladder wall thickening which could be due to underdistention versus a cystitis. Correlate with urinalysis.  UA: occasional bacteria, no nitrites, no wbcs,   CXR: no acute infiltrates or consolidations on wet read  RVP/COVID negative    Received in ED: Morphine 4mg IV x1, Rocephin 2g IV x1, Ofirmev x1, Zofran 4mg IV x1 32y M pmhx renal stones, testicular cancer, presented to the ED c/o headache and low back pain. Patients states he hasnt been "feeling right" for the past few weeks and was considering coming to the hospital. He had severe testicular pain that he believes is due to a stem cell scar revision surgery he had in [facility] with thigh fat liposuction. The testicular pain improved and for the past week he has been experiencing low back pain on and off- for the past 2 days the back pain became a 10/10 in severity with associated headache (worsening with light), neck pain, cold sweats, and fever. He last saw his PMD on Thursday who suggested the pain in the testicles can be d/t nerve damage from the surgery. He saw his urologist in January and had blood work and a scrotal sonogram with no acute findings. He states he had a recent rash on his forehead that now resolved along with a hypopigmented rash on his arm. He complains of HA, loopiness, nauseous, lower abdominal pain, weakness, and a metallic taste in his mouth.  Denies headache, dizziness, cp, sob, abd pain, n/v/d.     ED Course  Vitals: 99.2F, 106bpm, 126/78, 96%  RA  Labs: WBC 19, CMP wnl  CT head: unremarkable  CT chest: No pulmonary consolidation.  CT abdomen: No bowel obstruction or inflammation. Distended fluid-filled loops of small bowel in the central abdomen which is nonspecific, but could be seen in the setting of a gastroenteritis. Circumferential urinary bladder wall thickening which could be due to underdistention versus a cystitis. Correlate with urinalysis.  UA: occasional bacteria, no nitrites, no wbcs,   CXR: no acute infiltrates or consolidations on wet read  RVP/COVID negative    Received in ED: Morphine 4mg IV x1, Rocephin 2g IV x1, Ofirmev x1, Zofran 4mg IV x1 32y M pmhx renal stones, testicular cancer, presented to the ED c/o headache and low back pain. Patients states he hasn't been "feeling right" for the past few weeks and was considering coming to the hospital. He had severe testicular pain that he believes is due to a stem cell scar revision surgery he had in [facility] with thigh fat liposuction. The testicular pain improved and for the past week he has been experiencing low back pain on and off- for the past 2 days the back pain became a 10/10 in severity with associated headache (worsening with light), neck pain, cold sweats, and fever. He last saw his PMD on Thursday who suggested the pain in the testicles can be d/t nerve damage from the surgery. He saw his urologist in January and had blood work and a scrotal sonogram with no acute findings. He states he had a recent rash on his forehead that now resolved along with a hypopigmented rash on his arm. He complains of HA, loopiness, nauseous, lower abdominal pain, weakness, and a metallic taste in his mouth.  He also notes post-ejaculatory syncopal episodes and headache. Denies headache, dizziness, cp, sob, abd pain, n/v/d.     ED Course  Vitals: 99.2F, 106bpm, 126/78, 96%  RA  Labs: WBC 19, CMP wnl  CT head: unremarkable  CT chest: No pulmonary consolidation.  CT abdomen: No bowel obstruction or inflammation. Distended fluid-filled loops of small bowel in the central abdomen which is nonspecific, but could be seen in the setting of a gastroenteritis. Circumferential urinary bladder wall thickening which could be due to underdistention versus a cystitis. Correlate with urinalysis.  UA: occasional bacteria, no nitrites, no wbcs,   CXR: no acute infiltrates or consolidations on wet read  RVP/COVID negative    Received in ED: Morphine 4mg IV x1, Rocephin 2g IV x1, Ofirmev x1, Zofran 4mg IV x1 32y M pmhx renal stones, testicular cancer, presented to the ED c/o headache and low back pain. Patients states he hasn't been "feeling right" for the past few weeks and was considering coming to the hospital. He had severe testicular pain that he believes is due to a stem cell scar revision surgery he had in [facility] with thigh fat liposuction. The testicular pain improved and for the past week he has been experiencing low back pain on and off- for the past 2 days the back pain became a 10/10 in severity with associated headache (worsening with light), neck pain, cold sweats, and fever. He last saw his PMD on Thursday who suggested the pain in the testicles can be d/t nerve damage from the surgery. He saw his urologist in January and had blood work and a scrotal sonogram with no acute findings. He states he had a recent rash on his forehead that now resolved along with a hypopigmented rash on his arm. He complains of HA, loopiness, nauseous, lower abdominal pain, weakness, and a metallic taste in his mouth.  He also notes post-ejaculatory syncopal episodes and headache. Denies cp, sob, palpitations, diarrhea, constipation, urinary frequency/urgency, blood in urine/stool.    ED Course  Vitals: 99.2F, 106bpm, 126/78, 96%  RA  Labs: WBC 19, CMP wnl  CT head: unremarkable  CT chest: No pulmonary consolidation.  CT abdomen: No bowel obstruction or inflammation. Distended fluid-filled loops of small bowel in the central abdomen which is nonspecific, but could be seen in the setting of a gastroenteritis. Circumferential urinary bladder wall thickening which could be due to underdistention versus a cystitis. Correlate with urinalysis.  UA: occasional bacteria, no nitrites, no wbcs,   CXR: no acute infiltrates or consolidations on wet read  RVP/COVID negative    Received in ED: Morphine 4mg IV x1, Rocephin 2g IV x1, Ofirmev x1, Zofran 4mg IV x1 32y M pmhx renal stones, testicular cancer, presented to the ED c/o headache and low back pain. Patients states he hasn't been "feeling right" for the past few weeks and was considering coming to the hospital. He had severe testicular pain that he believes is due to a stem cell scar revision surgery he had in [facility] with thigh fat liposuction. The testicular pain improved and for the past week he has been experiencing low back pain on and off- for the past 2 days the back pain became a 10/10 in severity with associated headache (worsening with light), neck pain, cold sweats, and fever. He last saw his PMD on Thursday who suggested the pain in the testicles can be d/t nerve damage from the surgery. He saw his urologist in January and had blood work and a scrotal sonogram with no acute findings. He states he had a recent rash on his forehead that now resolved along with a hypopigmented rash on his arm. He complains of HA, loopiness, nauseous, lower abdominal pain, weakness, and a metallic taste in his mouth.  He also notes post-ejaculatory syncopal episodes and headache. Denies cp, sob, palpitations, diarrhea, constipation, urinary frequency/urgency, blood in urine/stool.    ED Course  Vitals: 99.2F, 106bpm, 126/78, 96%  RA  Labs: WBC 19, CMP wnl  CT head: unremarkable  CT chest: No pulmonary consolidation.  CT abdomen: No bowel obstruction or inflammation. Distended fluid-filled loops of small bowel in the central abdomen which is nonspecific, but could be seen in the setting of a gastroenteritis. Circumferential urinary bladder wall thickening which could be due to underdistention versus a cystitis. Correlate with urinalysis.  UA: occasional bacteria, no nitrites, no wbcs,   CXR: no acute infiltrates or consolidations on wet read  RVP/COVID negative  Received in ED: Morphine 4mg IV x1, Rocephin 2g IV x1, Ofirmev x1, Zofran 4mg IV x1

## 2023-03-27 NOTE — H&P ADULT - NSHPSOCIALHISTORY_GEN_ALL_CORE
Tobacco:  Alcohol:  Lives with:  ADL:  COVID: Tobacco: Denies  Alcohol: Denies  Lives with: Male partner   ADL: Independent   Ambulates without assistance   Smokes marijuana daily around 5 joints a day

## 2023-03-27 NOTE — ED ADULT TRIAGE NOTE - CHIEF COMPLAINT QUOTE
Pt c/o mid back pain, headache and cold sweats since last night, Also having a metallic taste in mouth.

## 2023-03-27 NOTE — H&P ADULT - ATTENDING COMMENTS
32y M pmhx renal stones, testicular cancer, presented to the ED c/o headache and low back pain. Patients states he hasn't been "feeling right" for the past few weeks and was considering coming to the hospital. Admitted for sepsis with unclear etiology    Agree with above. Edited where appropriate.

## 2023-03-27 NOTE — ED PROVIDER NOTE - PROGRESS NOTE DETAILS
discussed labs and imaging results with patient, offered lumbar puncture in ED, but patient would like to hold off on the procedure.  Discussed case with Dr. Hanson, will admit

## 2023-03-27 NOTE — ED PROVIDER NOTE - PHYSICAL EXAMINATION
Gen: Alert, NAD  Head/eyes: NC/AT, PERRL  ENT: airway patent  Neck: supple, FROM in neck, no rigidity   Pulm/lung: Bilateral clear BS  CV/heart: RRR  GI/Abd: soft, NT/ND, +BS, no guarding/rebound tenderness  Musculoskeletal: no edema/erythema/cyanosis  Skin: no rash  Neuro: AAOx3, grossly intact

## 2023-03-27 NOTE — ED PROVIDER NOTE - NSICDXPASTSURGICALHX_GEN_ALL_CORE_FT
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 No reoccurences

## 2023-03-27 NOTE — H&P ADULT - NSHPREVIEWOFSYSTEMS_GEN_ALL_CORE
CONSTITUTIONAL: denies fever, chills, fatigue, weakness  HEENT: denies blurred vision, sore throat  SKIN: denies new lesions, rash  CARDIOVASCULAR: denies chest pain, chest pressure, palpitations  RESPIRATORY: denies shortness of breath, sputum production  GASTROINTESTINAL: denies nausea, vomiting, diarrhea, abdominal pain  GENITOURINARY: denies dysuria, discharge  NEUROLOGICAL: denies numbness, headache, focal weakness  MUSCULOSKELETAL: denies new joint pain, muscle aches  HEMATOLOGIC: denies gross bleeding, bruising  LYMPHATICS: denies enlarged lymph nodes, extremity swelling  PSYCHIATRIC: denies recent changes in anxiety, depression  ENDOCRINOLOGIC: denies sweating, cold or heat intolerance CONSTITUTIONAL: +fever, chills, fatigue, weakness   HEENT: denies blurred vision, sore throat  SKIN: forehead rash, hypopigmented lesions on arm   CARDIOVASCULAR: denies chest pain, chest pressure, palpitations  RESPIRATORY: denies shortness of breath, sputum production  GASTROINTESTINAL: + lower abdominal pain   GENITOURINARY: denies dysuria, discharge  NEUROLOGICAL: +weakness, +HA +neck stiffness   MUSCULOSKELETAL: denies new joint pain, muscle aches  HEMATOLOGIC: denies gross bleeding, bruising  LYMPHATICS: denies enlarged lymph nodes, extremity swelling  PSYCHIATRIC: denies recent changes in anxiety, depression  ENDOCRINOLOGIC: denies sweating, cold or heat intolerance CONSTITUTIONAL: +fever, chills, fatigue, weakness   HEENT: denies blurred vision, sore throat admits headache  SKIN: forehead rash, hypopigmented lesions on arm   CARDIOVASCULAR: denies chest pain, chest pressure, palpitations  RESPIRATORY: denies shortness of breath, sputum production  GASTROINTESTINAL: + lower abdominal pain   GENITOURINARY: denies dysuria, discharge  NEUROLOGICAL: +weakness, +HA +neck stiffness   MUSCULOSKELETAL: denies new joint pain, muscle aches  HEMATOLOGIC: denies gross bleeding, bruising  LYMPHATICS: denies enlarged lymph nodes, extremity swelling  PSYCHIATRIC: denies recent changes in anxiety, depression  ENDOCRINOLOGIC: denies sweating, cold or heat intolerance

## 2023-03-27 NOTE — ED PROVIDER NOTE - CLINICAL SUMMARY MEDICAL DECISION MAKING FREE TEXT BOX
32-year-old male history of renal stone testicular cancer here with family complaining about 2 days of headache lower back pain with subjective chills fever cold sweats.  Denies any upper respiratory symptoms such as sore throat runny nose.  No medications taken for this.  Denies any photophobia does admit to some mild neck stiffness.  Denies any urinary symptoms.  No sick contacts.    viral syndrome, sepsis, meningitis, labs, RVP, IV analgesia, IV Fluids, reassess

## 2023-03-27 NOTE — ED PROVIDER NOTE - OBJECTIVE STATEMENT
32-year-old male history of renal stone testicular cancer here with family complaining about 2 days of headache lower back pain with subjective chills fever cold sweats.  Denies any upper respiratory symptoms such as sore throat runny nose.  No medications taken for this.  Denies any photophobia does admit to some mild neck stiffness.  Denies any urinary symptoms.  No sick contacts. 32-year-old male history of renal stone testicular cancer here with family complaining about 2 days of headache lower back pain with subjective chills fever cold sweats. Also c/o of metallic taste in mouth.  Denies any upper respiratory symptoms such as sore throat runny nose.  No medications taken for this.  Denies any photophobia does admit to some mild neck stiffness.  Denies any urinary symptoms.  No sick contacts.

## 2023-03-27 NOTE — ED PROVIDER NOTE - CADM POA PRESS ULCER
Health Maintenance Summary     Topic Due On Due Status Completed On Postpone Until Reason    Colorectal Cancer Screening - Colonoscopy Jul 20, 2022 Not Due Jul 20, 2012      IMMUNIZATION - DTaP/Tdap/Td Jun 29, 2007 Postponed Jun 28, 2007 Feb 2, 2018 Patient Refused    Hepatitis C Screening May 21, 2005 Postponed  Feb 2, 2018 Patient Refused          Patient is due for topics as listed above, he wishes to decline at this time    Chief Complaint   Patient presents with   • Physical     annual     ALLERGIES:  No Known Allergies    Current Outpatient Prescriptions   Medication Sig Dispense Refill   • FLUoxetine (PROZAC) 10 MG capsule Take 1 capsule by mouth daily. 90 capsule 3   • rOPINIRole (REQUIP) 1 MG tablet Take by mouth 1 tab TID 90 tablet 3   • rOPINIRole (REQUIP) 0.25 MG tablet Take 1 tablet by mouth daily as needed (tremors). 30 tablet 5   • RaNITidine HCl (ZANTAC PO)      • polyethylene glycol (MIRALAX) packet Take 17 g by mouth daily. 10 each 0   • CAFFEINE PO Take  by mouth.     • aspirin 325 MG tablet Take 325 mg by mouth daily.     • azelastine (ASTELIN) 0.1 % nasal spray Spray 1 spray in each nostril 2 times daily. Use in each nostril as directed 30 mL 12   • montelukast (SINGULAIR) 10 MG tablet Take 1 tablet by mouth daily. 30 tablet 5   • meclizine (ANTIVERT) 12.5 MG tablet Take 1 tablet by mouth 3 times daily as needed for Dizziness. 30 tablet 1   • triamcinolone (ARISTOCORT) 0.1 % cream Apply sparingly to affected area twice a day 30 g 3     No current facility-administered medications for this visit.        Patient Active Problem List   Diagnosis   • Hypotestosteronism   • Depression   • Panic attack   • Fatigue   • Iatrogenic hyperthyroidism   • Elevated antinuclear antibody (ASMITA) level   • PD (Parkinson's disease) (CMS/HCC)   • Vitamin D deficiency   • Chronic cough   • Asbestos exposure   • SK (seborrheic keratosis)       Past Medical History:   Diagnosis Date   • Esophageal reflux    • PD  (Parkinson's disease) (CMS/[facility], located within [facility]) 9/1/2017       Past Surgical History:   Procedure Laterality Date   • BACK SURGERY      15 yaers ago       Social History     Social History   • Marital status:      Spouse name: N/A   • Number of children: N/A   • Years of education: N/A     Occupational History   • Not on file.     Social History Main Topics   • Smoking status: Never Smoker   • Smokeless tobacco: Never Used   • Alcohol use No      Comment: occasional   • Drug use: No   • Sexual activity: Not on file     Other Topics Concern   • Seat Belt Yes     Social History Narrative   • No narrative on file       family history includes Alcohol Abuse in his father and mother; Cancer in his mother; Heart disease in his father.    HPI:  Note 2/2/2018:  Patient is present in clinic today for CPE.  Note 9/1/17: Here for 3 mo f/u. Sometimes stiff, otherwise doing well. Requesting refill for Prozac 10mg instead of 20 mg.  Note 6/1/17: Patient is present in clinic today for 2 month f/u on dysthymia.  Is taking the 20 mg of the prozac but not the selegiline because the pharmacy wouldn't give both.  Emotionally getting by.  Hasn't made an appt with Dr Acharya and encouraged to do so.   Appetite is fine.  Weight is up a abit.  Tremor is no worse.  Has appt with Dr Jordan in 9/2017.  Encouraged pt to call his office to discuss the Selegiline.    1) prozac needs to be 20 mg instead of 10 mg. And refill  Note 3/30/17:  Pt is here for 2 week follow up. Convey Lab results from 3/22/17. States he doesn't feel the affects of the Prozac, still taking the 10mg.  Saw Dr Jordan who thought this was early parkinson's.  Also concerned about the + ASMITA and DSDNA.  Will have him see Dr Acharya.  Emotionally doing better but still quite fatigued.   Note 3/13/17:  Was better on the cymbalta but caused constipation and urinary retention.   Had to stop this.  Pt believes now that he had depression.  Willing to take meds.  Would like to avoid cymbalta for now.   Also discussed the MRI which raised a number of issues about lymes vs MS vs ?.  Agrees to neuro consult and prozac and seeing me in 2 weeks.  Labs were reviewed showing hyperthyroidism which was iatrogenic due to medication from his previous medical provider.  This was stopped a month ago and repeat testing was fine.  Will repeat again in 1 month.  Note 2/10/17:  Still lots of fatigue and weak.  Slight tremor that is no worse with the cymbalta.  Labs showed an elevated Free T3.  Pt has stopped the thyroid medication from the other doctor.  His writing is worse in the last 9-10 months.  Coordination is much poorer in arms and legs.    Note 2/3/17:  Lots of fatigue and weak.  In the past has thought depression but antidepressants causes him to sleep.  Pt thinks it's his thyroid.  Seeing chiropractor (Dr PAUL Walls, [place]) who is prescribing a natural thyroid support though the chiro indicates that his diagnosis is hyperthyroidism. Previous illness cleared from 1 month ago.  Pt states that he identifies with the [employer] about the man who is sad at his own birthday party.  No SI/HI   Note 1/6/17:  Has been ill for 1 month with cough and production.  Was a little better but now worse again.  No cp or sob.  Has had some fever and sweats.  Got this illness from his daughter.  Slight nausea but no vomiting.  Some dizziness this am.    Note 9/13/16:  Pt c/o cough, wheezing, congestion x3 weeks. Cough producing green phlegm.  Copious sinus drainage.  Was seen in urgent care and given augmentin w/o much benefit.  He states that his daughter is also sick, has tried otc meds. Some nausea when starts driving off and on for a few years.  No worse.  Wonders about his sinuses.  .  Wonders if he has walking pneumonia.  No cp or sob.  No pnd or orthopnea.    Note 10/7/14: Patient requests help addressing blood work results 6/2014. Lab results note elevated TGs and elevated GGT. Patient notes L arm numbness following fatty meals.  Patient takes 6-8 aspirin per day for the past year and 4 200mg caffeine pills/day. Patient complains of lethargy for the past 1-2 years, around the same time the patient started to feel depressed.Patient also reports taking [employer] product, not sure if vitamin or pro-hormone concerning male enhancement, to help boost testosterone. Patient denies any anxiety or panic attacks. Patient notes clumpy stool and constipation.  Depression:    Note 2/2/2018:  Doing well on the prozac 10 mg daily.    Note 9/1/17: doing much better and would like to decrease the dose to 10 mg daily.  Parkinson's Disease:    Note 2/2/2018:  Seeing Dr Jordan and the requip works variably.  Will see him again in a month or so.    Note 9/1/17: taking requip tid and doing much better.  Tremor is better.  Coordination is better.  Memory is better and slurred speech is better.  Notices when gets to the end of the dose.    ^dsDNA  Note 2/2/2018:  Saw Dr Acharya who didn't believe he had Lupus or another CTD.    Note 9/1/17: seeing Dr Acharya  Asbestos exposure  Note 2/2/2018:  Exposed at [facility].  Chronic cough for the past 6-8 weeks.  Not severe but irritating.    ROS:  Gen:  No c/o other than weight gain with eating better.  Eye Problem(s):  Vision good with glasses  ENT Problem(s):  Hearing is fine  Cardiovascular problem(s):  Denies exertional chest pain, sob (shortness of breath), pnd (paroxysmal nocturnal dyspnea) or orthopnea.     Respiratory problem(s):negative  Gastro-intestinal problem(s):  Daily nausea for the past 3 years.  Takes 3 aspirin and this clears.  Takes this daily.    Genito-urinary problem(s):negative  Musculoskeletal problem(s):  Ache at times  Integumentary problem(s):negative  Neurological problem(s):  See above  Psychiatric problem(s):  negative  Endocrine problem(s):  negative  Hematologic and/or Lymphatic problem(s):  negative     EXAM:  Vitals:    02/02/18 1032   BP: 110/62   BP Location: Right upper extremity   Patient  Position: Sitting   Cuff Size: Large Adult   Pulse: 93   SpO2: 93%   Weight: 90 kg   Height: 5' 9\" (1.753 m)   General:  Well developed, well nourished male in no acute distress, alert and OX3 (oriented x3).  HEENT:  Normocephalic, no gross lesions about the head and neck, C/S (conjunctivae/sclerae) clear, PERRLA (pupils equal, round, reactive to light and accommodation), EOMI (extraocular movements intact), TMs (tympanic membranes) wnl (within normal limits), NMT (nose, mouth, throat) unremarkable.   Neck:  w/o (Without) cervical or supraclavicular lymphadenopathy, thyromegaly, masses or carotid bruits.  Trachea is midline.  Chest:  Clear to auscultation bilaterally, unlabored breathing.  Heart:  Regular rhythm, no murmurs, gallops or rubs.  Abdomen:  Soft and nontender without hepatosplenomegaly or masses.  Rectal 2018:  No rectal masses and normal prostate without masses or irregular areas.  Extremities:  Without clubbing, cyanosis, edema or varices.  Good pedal pulses.  Neurologic:  CN (Cranial nerves) II-XII grossly intact, DTR's (deep tendon reflexes) 2+ and symmetrical, Romberg and tandem walk are accomplished without difficulty and gait and station are normal.     Most Recent Labs:  Office Visit on 2017   Component Date Value Ref Range Status   • Pathology Report 2017    Final                    Value:Name: LATISHA TOMPKINS                MRN:     [id number]    /Age:1955 (Age: 62)            Visit#:  [id number]    Sex: M                        Pathology Report        Client: [facility]        Submitting Physician: Jesse Hamm II, II, MD        Date Specimen Collected: 17           Accession #:  [id number]    Date Specimen Received:  17           Requisition #:[id number]    Date Reported:           2017 14:05   Location:     [facility]        ______________________________________________________________________________     Pathologic Diagnosis :    Skin, left temporal scalp, shave biopsy:-  Seborrheic keratosis, inflamed.          Dainel Heller MD    ** Electronic Signature ([facility]) 8/4/2017 14:05 **    ______________________________________________________________________________        Clinical Information:    Comment:SENT TO [facility] FOR READING.     SEND SLIDES TO DR. WASHINGTON RICK.    WHAT IS THE SPECIMEN TYPE:                          SHAVE - LEFT TEMPORAL SCALP - DARK BROWN MACULE    WITHIN A TAN PLAQUE - RULE OUT MELANOMA IN SK    HOW MANY SPECIMENS ARE BEING SUBMITTED?:1    NUMBER OF CONTAINERS?:1    WHAT IS THE TIME SPECIMEN PLACED IN FORMALIN?:1458    TIME SPECIMEN OBTAINED:1458        Specimen(s) Submitted:     LEFT TEMPORAL SCALP        Gross Description:    Received in formalin, labeled with the patient's name (DB) and \"left temporal    scalp\", is a 1.2 x 0.8 x 0.2 cm shave of a tan grey granular skin nodule, with    a 0.3 x 0.2 cm area of dark brown discoloration at one end.  The base of the    specimen is inked blue and the specimen is sectioned.  The specimen is    entirely submitted in one cassette.        TEM 8/3/2017 04:53 AM            Microscopic Description:    Sections show epidermal hyperplasia comprised of broad rete ridges with    monomorphic basaloid cells and cornified pseudocysts.  There is a modest,    predominantly lymphocytic infiltrate in the dermis.          RYLEE/harley 08/04/17        Fee C                          odes:     A: [id number], [id number]        Performing Lab Location (Unless otherwise specified):    [place] [id number]           Diagnoses at this visit include:  1. General medical exam    2. Vitamin D deficiency    3. Chronic cough    4. Asbestos exposure      Please see the diagnoses for this visit, medications ordered and continued, instructions, other orders and all planned follow up.   Medications prescribed and Orders from this  visit:  Orders Placed This Encounter   • CT Chest   • Thyroid Stimulating Hormone   • Free T4   • CBC & Auto Differential   • Comprehensive Metabolic Panel   • Lipid Panel with Reflex   • Urinalysis with Micro & Culture if Indicated   • Prostate Specific Antigen   • Vitamin D -25 Hydroxy   • FLUoxetine (PROZAC) 10 MG capsule         Patient Instructions   Please have fasting labs and urine next week.   Do not eat any food or drink any beverages for 12 hours before having the testing done. You may have water only; NO candy, gum, mints, coffee, soda or juice.  Please take all medications as usual. Do not drink any alcoholic beverages for 24 hours prior to testing.    Please return to my office for follow up in 6 months.     Please contact central scheduling at [phone] to schedule your CT chest with contrast.     Can use over the counter Debrox ear cleaning drops as directed.         Follow Up:  Return in about 6 months (around 8/2/2018) for FUV.     .       No

## 2023-03-27 NOTE — ED PROVIDER NOTE - CARE PLAN
1 Principal Discharge DX:	Leukocytosis  Secondary Diagnosis:	Headache  Secondary Diagnosis:	Back pain

## 2023-03-27 NOTE — H&P ADULT - NSHPPHYSICALEXAM_GEN_ALL_CORE
T(C): 37.3 (03-27-23 @ 04:57), Max: 37.3 (03-27-23 @ 01:24)  HR: 90 (03-27-23 @ 04:57) (90 - 106)  BP: 112/67 (03-27-23 @ 04:57) (112/67 - 126/78)  RR: 18 (03-27-23 @ 04:57) (18 - 18)  SpO2: 100% (03-27-23 @ 04:57) (96% - 100%)    GENERAL: patient appears well, no acute distress, appropriate, pleasant  EYES: sclera clear, no exudates  ENMT: oropharynx clear without erythema, no exudates, moist mucous membranes  NECK: supple, soft, no thyromegaly noted  LUNGS: good air entry bilaterally, clear to auscultation, symmetric breath sounds, no wheezing or rhonchi appreciated  HEART: soft S1/S2, regular rate and rhythm, no murmurs noted, no lower extremity edema  GASTROINTESTINAL: abdomen is soft, nontender, nondistended, normoactive bowel sounds, no palpable masses  INTEGUMENT: good skin turgor, warm skin, appears well perfused  MUSCULOSKELETAL: no clubbing or cyanosis, no obvious deformity  NEUROLOGIC: awake, alert, oriented x3, good muscle tone in 4 extremities, no obvious sensory deficits  PSYCHIATRIC: mood is good, affect is congruent, linear and logical thought process  HEME/LYMPH: no palpable supraclavicular nodules, no obvious ecchymosis or petechiae T(C): 37.3 (03-27-23 @ 04:57), Max: 37.3 (03-27-23 @ 01:24)  HR: 90 (03-27-23 @ 04:57) (90 - 106)  BP: 112/67 (03-27-23 @ 04:57) (112/67 - 126/78)  RR: 18 (03-27-23 @ 04:57) (18 - 18)  SpO2: 100% (03-27-23 @ 04:57) (96% - 100%)    GENERAL: appears fatigued and weak   EYES: sclera clear, no exudates  ENMT: oropharynx clear without erythema, no exudates, moist mucous membranes  NECK: supple, soft, no thyromegaly noted  LUNGS: good air entry bilaterally, clear to auscultation, symmetric breath sounds, no wheezing or rhonchi appreciated  HEART: soft S1/S2, regular rate and rhythm, no murmurs noted, no lower extremity edema  GASTROINTESTINAL: +tenderness to palpation in lower abdomen; abdomen is soft,  nondistended, normoactive bowel sounds, no palpable masses  INTEGUMENT: good skin turgor, warm skin, appears well perfused  MUSCULOSKELETAL: no clubbing or cyanosis, no obvious deformity  NEUROLOGIC: awake, alert, oriented x3, good muscle tone in 4 extremities, no obvious sensory deficits; negative Brudzinksi and Kernig's Sign, +neck pain + photophobia   PSYCHIATRIC: mood is good, affect is congruent, linear and logical thought process  HEME/LYMPH: no palpable supraclavicular nodules, no obvious ecchymosis or petechiae T(C): 37.3 (03-27-23 @ 04:57), Max: 37.3 (03-27-23 @ 01:24)  HR: 90 (03-27-23 @ 04:57) (90 - 106)  BP: 112/67 (03-27-23 @ 04:57) (112/67 - 126/78)  RR: 18 (03-27-23 @ 04:57) (18 - 18)  SpO2: 100% (03-27-23 @ 04:57) (96% - 100%)  GENERAL: appears fatigued and weak   EYES: sclera clear, no exudates  ENMT: oropharynx clear without erythema, no exudates, moist mucous membranes  NECK: supple, soft, no thyromegaly noted  LUNGS: good air entry bilaterally, clear to auscultation, symmetric breath sounds, no wheezing or rhonchi appreciated  HEART: soft S1/S2, regular rate and rhythm, no murmurs noted, no lower extremity edema  GASTROINTESTINAL: +tenderness to palpation in lower abdomen; abdomen is soft,  nondistended, normoactive bowel sounds, no palpable masses  INTEGUMENT: good skin turgor, warm skin, appears well perfused  MUSCULOSKELETAL: no clubbing or cyanosis, no obvious deformity  NEUROLOGIC: awake, alert, oriented x3, good muscle tone in 4 extremities, no obvious sensory deficits; negative Brudzinksi and Kernig's Sign, +neck pain + photophobia   PSYCHIATRIC: mood is good, affect is congruent, linear and logical thought process  HEME/LYMPH: no palpable supraclavicular nodules, no obvious ecchymosis or petechiae

## 2023-03-27 NOTE — PROGRESS NOTE ADULT - ASSESSMENT
32y M pmhx renal stones, testicular cancer, presented to the ED c/o headache and low back pain. Patients states he hasn't been "feeling right" for the past few weeks and was considering coming to the hospital. Admitted for sepsis with unclear etiology

## 2023-03-27 NOTE — PATIENT PROFILE ADULT - FALL HARM RISK - UNIVERSAL INTERVENTIONS
Bed in lowest position, wheels locked, appropriate side rails in place/Call bell, personal items and telephone in reach/Instruct patient to call for assistance before getting out of bed or chair/Non-slip footwear when patient is out of bed/[place] to call system/Physically safe environment - no spills, clutter or unnecessary equipment/Purposeful Proactive Rounding/Room/bathroom lighting operational, light cord in reach

## 2023-03-27 NOTE — ED ADULT NURSE NOTE - OBJECTIVE STATEMENT
Patient came in to ED c/o back pain, headache feeling hot and cold and metal taste x 2 days. Patient states that on December had chronic testicular pain and followed up with Urologist- had ultrasound done and was normal. Patient denies burning on urination.

## 2023-03-27 NOTE — H&P ADULT - NSICDXFAMILYHX_GEN_ALL_CORE_FT
FAMILY HISTORY:  No pertinent family history in first degree relatives     FAMILY HISTORY:  Father  Still living? Unknown  FH: type 2 diabetes, Age at diagnosis: Age Unknown

## 2023-03-27 NOTE — PROGRESS NOTE ADULT - SUBJECTIVE AND OBJECTIVE BOX
Patient is a 32y old  Male who presents with a chief complaint of headache, back pain (27 Mar 2023 05:28)      Subjective:  INTERVAL HPI/OVERNIGHT EVENTS: Patient seen and examined at bedside. pt was admitted overnight with multiple complaints. currently states headache and back pain is improved. pt still having abdominal pain, comes and goes, stabbing.     MEDICATIONS  (STANDING):  atorvastatin 40 milliGRAM(s) Oral at bedtime  cefTRIAXone   IVPB 1000 milliGRAM(s) IV Intermittent every 24 hours  famotidine    Tablet 20 milliGRAM(s) Oral daily  finasteride 5 milliGRAM(s) Oral daily  vancomycin  IVPB 1000 milliGRAM(s) IV Intermittent every 12 hours    MEDICATIONS  (PRN):  acetaminophen     Tablet .. 650 milliGRAM(s) Oral every 6 hours PRN Temp greater or equal to 38C (100.4F), Mild Pain (1 - 3)  aluminum hydroxide/magnesium hydroxide/simethicone Suspension 30 milliLiter(s) Oral every 4 hours PRN Dyspepsia  melatonin 3 milliGRAM(s) Oral at bedtime PRN Insomnia  morphine  - Injectable 2 milliGRAM(s) IV Push every 6 hours PRN Severe Pain (7 - 10)  ondansetron Injectable 4 milliGRAM(s) IV Push every 8 hours PRN Nausea and/or Vomiting  oxyCODONE    IR 5 milliGRAM(s) Oral every 6 hours PRN Moderate Pain (4 - 6)      Allergies    No Known Allergies    Intolerances        REVIEW OF SYSTEMS:  CONSTITUTIONAL: No fever or chills  HEENT:  +headache denies visual changes  RESPIRATORY: No cough, wheezing, or shortness of breath  CARDIOVASCULAR: No chest pain, palpitations  GASTROINTESTINAL: +abd pain , no n/v  GENITOURINARY: No dysuria, frequency, or hematuria  NEUROLOGICAL: no focal weakness or dizziness  MUSCULOSKELETAL: +back pain    Objective:  Vital Signs Last 24 Hrs  T(C): 36.9 (27 Mar 2023 09:10), Max: 37.3 (27 Mar 2023 01:24)  T(F): 98.4 (27 Mar 2023 09:10), Max: 99.2 (27 Mar 2023 01:24)  HR: 94 (27 Mar 2023 09:10) (90 - 106)  BP: 106/60 (27 Mar 2023 09:10) (106/60 - 126/78)  BP(mean): --  RR: 18 (27 Mar 2023 09:10) (18 - 18)  SpO2: 97% (27 Mar 2023 09:10) (96% - 100%)    Parameters below as of 27 Mar 2023 09:10  Patient On (Oxygen Delivery Method): room air        GENERAL: anxious appearing, mild distress due to pain  HEAD:  Atraumatic, Normocephalic  EYES: EOMI, PERRLA, conjunctiva and sclera clear  ENT: Moist mucous membranes  NECK: Supple, No JVD  CHEST/LUNG: Clear to auscultation bilaterally; No rales, rhonchi, wheezing, or rubs. Unlabored respirations  HEART: tachycardic, No murmurs, rubs, or gallops  ABDOMEN: Bowel sounds present; Soft, diffusely ttp, no rebound or guarding  EXTREMITIES:  2+ Peripheral Pulses, brisk capillary refill. No clubbing, cyanosis, or edema  NERVOUS SYSTEM:  Alert & Oriented X3, speech clear. No deficits   MSK: FROM all 4 extremities, full and equal strength  SKIN: No rashes, warm, dry    LABS:                        13.8   17.61 )-----------( 158      ( 27 Mar 2023 07:00 )             40.1     CBC Full  -  ( 27 Mar 2023 07:00 )  WBC Count : 17.61 K/uL  Hemoglobin : 13.8 g/dL  Hematocrit : 40.1 %  Platelet Count - Automated : 158 K/uL  Mean Cell Volume : 86.8 fl  Mean Cell Hemoglobin : 29.9 pg  Mean Cell Hemoglobin Concentration : 34.4 gm/dL  Auto Neutrophil # : 15.63 K/uL  Auto Lymphocyte # : 0.99 K/uL  Auto Monocyte # : 0.89 K/uL  Auto Eosinophil # : 0.00 K/uL  Auto Basophil # : 0.02 K/uL  Auto Neutrophil % : 88.7 %  Auto Lymphocyte % : 5.6 %  Auto Monocyte % : 5.1 %  Auto Eosinophil % : 0.0 %  Auto Basophil % : 0.1 %    27 Mar 2023 07:00    137    |  107    |  12     ----------------------------<  137    4.0     |  26     |  0.99     Ca    8.6        27 Mar 2023 07:00    TPro  7.4    /  Alb  3.6    /  TBili  1.0    /  DBili  x      /  AST  78     /  ALT  68     /  AlkPhos  62     27 Mar 2023 07:00    PT/INR - ( 27 Mar 2023 02:15 )   PT: 13.5 sec;   INR: 1.15 ratio         PTT - ( 27 Mar 2023 02:15 )  PTT:39.3 sec  Urinalysis Basic - ( 27 Mar 2023 04:13 )    Color: Yellow / Appearance: Clear / S.020 / pH: x  Gluc: x / Ketone: Moderate  / Bili: Negative / Urobili: Negative   Blood: x / Protein: Negative / Nitrite: Negative   Leuk Esterase: Negative / RBC: 0-2 /HPF / WBC 0-2   Sq Epi: x / Non Sq Epi: Negative / Bacteria: Occasional      CAPILLARY BLOOD GLUCOSE              RADIOLOGY & ADDITIONAL TESTS:    Personally reviewed.     Consultant(s) Notes Reviewed:  [x] YES  [ ] NO

## 2023-03-27 NOTE — CONSULT NOTE ADULT - ASSESSMENT
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 Associated with subjective fevers. Unclear etiology of multiple symptoms. Clinical suspicion seems lower for bacterial meningitis, but will cover for now pending further evaluation.     He has no fever here but did present with leukocytosis of 19. MRI brain without contrast unrevealing. CT chest and A/P reviewed and also without clear explanation of his symptoms. Urinalysis does not appear consistent with UTI, and US without evidence of testicular torsion.    -increase ceftriaxone to 2g IV q12h for now  -continue vancomycin  -follow blood and urine cultures  -monitor WBC, LFT's  -suggest HIV testing (patient approved verbally), Lyme and tick serologies, Babesia PCR  -suggest MRI lumbar spine with contrast  -if workup unrevealing and symptoms persist suggest lumbar puncture    Thank you for courtesy of this consult.     Will follow.  Discussed with the primary team.     Maryana Rodgers MD  Division of Infectious Diseases   Cell [phone] between 8am and 6pm   After 6pm and weekends please call ID service at [phone].

## 2023-03-28 LAB
ANION GAP SERPL CALC-SCNC: 6 MMOL/L — SIGNIFICANT CHANGE UP (ref 5–17)
APTT BLD: 36.2 SEC — HIGH (ref 27.5–35.5)
B BURGDOR C6 AB SER-ACNC: NEGATIVE — SIGNIFICANT CHANGE UP
B BURGDOR IGG+IGM SER-ACNC: 0.15 INDEX — SIGNIFICANT CHANGE UP (ref 0.01–0.89)
BABESIA MICROTI PCR, BLD RESULT: SIGNIFICANT CHANGE UP
BASOPHILS # BLD AUTO: 0.02 K/UL — SIGNIFICANT CHANGE UP (ref 0–0.2)
BASOPHILS NFR BLD AUTO: 0.2 % — SIGNIFICANT CHANGE UP (ref 0–2)
BUN SERPL-MCNC: 9 MG/DL — SIGNIFICANT CHANGE UP (ref 7–23)
CALCIUM SERPL-MCNC: 8.9 MG/DL — SIGNIFICANT CHANGE UP (ref 8.5–10.1)
CARDIOLIPIN AB SER-ACNC: NEGATIVE — SIGNIFICANT CHANGE UP
CHLORIDE SERPL-SCNC: 107 MMOL/L — SIGNIFICANT CHANGE UP (ref 96–108)
CO2 SERPL-SCNC: 25 MMOL/L — SIGNIFICANT CHANGE UP (ref 22–31)
CREAT SERPL-MCNC: 1 MG/DL — SIGNIFICANT CHANGE UP (ref 0.5–1.3)
CRP SERPL-MCNC: 108 MG/L — HIGH
CULTURE RESULTS: SIGNIFICANT CHANGE UP
EGFR: 102 ML/MIN/1.73M2 — SIGNIFICANT CHANGE UP
EOSINOPHIL # BLD AUTO: 0.02 K/UL — SIGNIFICANT CHANGE UP (ref 0–0.5)
EOSINOPHIL NFR BLD AUTO: 0.2 % — SIGNIFICANT CHANGE UP (ref 0–6)
GLUCOSE CSF-MCNC: 77 MG/DL — HIGH (ref 40–70)
GLUCOSE SERPL-MCNC: 112 MG/DL — HIGH (ref 70–99)
GRAM STN FLD: SIGNIFICANT CHANGE UP
HCT VFR BLD CALC: 38.6 % — LOW (ref 39–50)
HCT VFR BLD CALC: 38.6 % — LOW (ref 39–50)
HGB BLD-MCNC: 13.3 G/DL — SIGNIFICANT CHANGE UP (ref 13–17)
HGB BLD-MCNC: 13.3 G/DL — SIGNIFICANT CHANGE UP (ref 13–17)
HIV 1+2 AB+HIV1 P24 AG SERPL QL IA: SIGNIFICANT CHANGE UP
IMM GRANULOCYTES NFR BLD AUTO: 0.6 % — SIGNIFICANT CHANGE UP (ref 0–0.9)
INR BLD: 1.33 RATIO — HIGH (ref 0.88–1.16)
LYMPHOCYTES # BLD AUTO: 0.95 K/UL — LOW (ref 1–3.3)
LYMPHOCYTES # BLD AUTO: 7.6 % — LOW (ref 13–44)
MCHC RBC-ENTMCNC: 29.4 PG — SIGNIFICANT CHANGE UP (ref 27–34)
MCHC RBC-ENTMCNC: 29.4 PG — SIGNIFICANT CHANGE UP (ref 27–34)
MCHC RBC-ENTMCNC: 34.5 GM/DL — SIGNIFICANT CHANGE UP (ref 32–36)
MCHC RBC-ENTMCNC: 34.5 GM/DL — SIGNIFICANT CHANGE UP (ref 32–36)
MCV RBC AUTO: 85.4 FL — SIGNIFICANT CHANGE UP (ref 80–100)
MCV RBC AUTO: 85.4 FL — SIGNIFICANT CHANGE UP (ref 80–100)
MONOCYTES # BLD AUTO: 0.9 K/UL — SIGNIFICANT CHANGE UP (ref 0–0.9)
MONOCYTES NFR BLD AUTO: 7.2 % — SIGNIFICANT CHANGE UP (ref 2–14)
NEUTROPHILS # BLD AUTO: 10.47 K/UL — HIGH (ref 1.8–7.4)
NEUTROPHILS NFR BLD AUTO: 84.2 % — HIGH (ref 43–77)
NIGHT BLUE STAIN TISS: SIGNIFICANT CHANGE UP
NRBC # BLD: 0 /100 WBCS — SIGNIFICANT CHANGE UP (ref 0–0)
NRBC # BLD: 0 /100 WBCS — SIGNIFICANT CHANGE UP (ref 0–0)
PLATELET # BLD AUTO: 146 K/UL — LOW (ref 150–400)
PLATELET # BLD AUTO: 149 K/UL — LOW (ref 150–400)
POTASSIUM SERPL-MCNC: 3.4 MMOL/L — LOW (ref 3.5–5.3)
POTASSIUM SERPL-SCNC: 3.4 MMOL/L — LOW (ref 3.5–5.3)
PROT CSF-MCNC: 33 MG/DL — SIGNIFICANT CHANGE UP (ref 15–45)
PROTHROM AB SERPL-ACNC: 15.6 SEC — HIGH (ref 10.5–13.4)
RBC # BLD: 4.52 M/UL — SIGNIFICANT CHANGE UP (ref 4.2–5.8)
RBC # BLD: 4.52 M/UL — SIGNIFICANT CHANGE UP (ref 4.2–5.8)
RBC # FLD: 12.7 % — SIGNIFICANT CHANGE UP (ref 10.3–14.5)
RBC # FLD: 12.8 % — SIGNIFICANT CHANGE UP (ref 10.3–14.5)
RHEUMATOID FACT SERPL-ACNC: 15 IU/ML — HIGH (ref 0–13)
SODIUM SERPL-SCNC: 138 MMOL/L — SIGNIFICANT CHANGE UP (ref 135–145)
SPECIMEN SOURCE: SIGNIFICANT CHANGE UP
WBC # BLD: 12.43 K/UL — HIGH (ref 3.8–10.5)
WBC # BLD: 13.78 K/UL — HIGH (ref 3.8–10.5)
WBC # FLD AUTO: 12.43 K/UL — HIGH (ref 3.8–10.5)
WBC # FLD AUTO: 13.78 K/UL — HIGH (ref 3.8–10.5)

## 2023-03-28 PROCEDURE — 99233 SBSQ HOSP IP/OBS HIGH 50: CPT

## 2023-03-28 RX ORDER — LIDOCAINE HCL 20 MG/ML
1 VIAL (ML) INJECTION ONCE
Refills: 0 | Status: COMPLETED | OUTPATIENT
Start: 2023-03-28 | End: 2023-03-28

## 2023-03-28 RX ORDER — SIMETHICONE 80 MG/1
80 TABLET, CHEWABLE ORAL
Refills: 0 | Status: DISCONTINUED | OUTPATIENT
Start: 2023-03-28 | End: 2023-04-01

## 2023-03-28 RX ORDER — ACYCLOVIR SODIUM 500 MG
VIAL (EA) INTRAVENOUS
Refills: 0 | Status: DISCONTINUED | OUTPATIENT
Start: 2023-03-28 | End: 2023-03-29

## 2023-03-28 RX ORDER — POLYETHYLENE GLYCOL 3350 17 G/17G
17 POWDER, FOR SOLUTION ORAL DAILY
Refills: 0 | Status: DISCONTINUED | OUTPATIENT
Start: 2023-03-28 | End: 2023-04-01

## 2023-03-28 RX ORDER — ACYCLOVIR SODIUM 500 MG
650 VIAL (EA) INTRAVENOUS ONCE
Refills: 0 | Status: COMPLETED | OUTPATIENT
Start: 2023-03-28 | End: 2023-03-28

## 2023-03-28 RX ORDER — HYDROMORPHONE HYDROCHLORIDE 2 MG/ML
1 INJECTION INTRAMUSCULAR; INTRAVENOUS; SUBCUTANEOUS ONCE
Refills: 0 | Status: DISCONTINUED | OUTPATIENT
Start: 2023-03-28 | End: 2023-03-28

## 2023-03-28 RX ORDER — ACYCLOVIR SODIUM 500 MG
650 VIAL (EA) INTRAVENOUS EVERY 8 HOURS
Refills: 0 | Status: DISCONTINUED | OUTPATIENT
Start: 2023-03-28 | End: 2023-03-29

## 2023-03-28 RX ORDER — SENNA PLUS 8.6 MG/1
2 TABLET ORAL AT BEDTIME
Refills: 0 | Status: DISCONTINUED | OUTPATIENT
Start: 2023-03-28 | End: 2023-04-01

## 2023-03-28 RX ADMIN — POLYETHYLENE GLYCOL 3350 17 GRAM(S): 17 POWDER, FOR SOLUTION ORAL at 12:01

## 2023-03-28 RX ADMIN — CEFTRIAXONE 100 MILLIGRAM(S): 500 INJECTION, POWDER, FOR SOLUTION INTRAMUSCULAR; INTRAVENOUS at 05:19

## 2023-03-28 RX ADMIN — OXYCODONE HYDROCHLORIDE 5 MILLIGRAM(S): 5 TABLET ORAL at 11:59

## 2023-03-28 RX ADMIN — Medication 263 MILLIGRAM(S): at 14:38

## 2023-03-28 RX ADMIN — Medication 250 MILLIGRAM(S): at 23:59

## 2023-03-28 RX ADMIN — MORPHINE SULFATE 2 MILLIGRAM(S): 50 CAPSULE, EXTENDED RELEASE ORAL at 18:35

## 2023-03-28 RX ADMIN — OXYCODONE HYDROCHLORIDE 5 MILLIGRAM(S): 5 TABLET ORAL at 00:03

## 2023-03-28 RX ADMIN — HYDROMORPHONE HYDROCHLORIDE 1 MILLIGRAM(S): 2 INJECTION INTRAMUSCULAR; INTRAVENOUS; SUBCUTANEOUS at 20:32

## 2023-03-28 RX ADMIN — Medication 650 MILLIGRAM(S): at 21:40

## 2023-03-28 RX ADMIN — Medication 1 MILLILITER(S): at 13:25

## 2023-03-28 RX ADMIN — OXYCODONE HYDROCHLORIDE 5 MILLIGRAM(S): 5 TABLET ORAL at 13:00

## 2023-03-28 RX ADMIN — FINASTERIDE 5 MILLIGRAM(S): 5 TABLET, FILM COATED ORAL at 11:59

## 2023-03-28 RX ADMIN — MORPHINE SULFATE 2 MILLIGRAM(S): 50 CAPSULE, EXTENDED RELEASE ORAL at 18:50

## 2023-03-28 RX ADMIN — Medication 250 MILLIGRAM(S): at 11:09

## 2023-03-28 RX ADMIN — ATORVASTATIN CALCIUM 40 MILLIGRAM(S): 80 TABLET, FILM COATED ORAL at 21:11

## 2023-03-28 RX ADMIN — SIMETHICONE 80 MILLIGRAM(S): 80 TABLET, CHEWABLE ORAL at 11:58

## 2023-03-28 RX ADMIN — SENNA PLUS 2 TABLET(S): 8.6 TABLET ORAL at 21:11

## 2023-03-28 RX ADMIN — Medication 650 MILLIGRAM(S): at 21:10

## 2023-03-28 RX ADMIN — CEFTRIAXONE 100 MILLIGRAM(S): 500 INJECTION, POWDER, FOR SOLUTION INTRAMUSCULAR; INTRAVENOUS at 18:35

## 2023-03-28 RX ADMIN — FAMOTIDINE 20 MILLIGRAM(S): 10 INJECTION INTRAVENOUS at 11:59

## 2023-03-28 RX ADMIN — HYDROMORPHONE HYDROCHLORIDE 1 MILLIGRAM(S): 2 INJECTION INTRAMUSCULAR; INTRAVENOUS; SUBCUTANEOUS at 20:47

## 2023-03-28 NOTE — PROGRESS NOTE ADULT - PROBLEM SELECTOR PLAN 1
Patient met SIRS criteria on admission: leukocytosis and tachycardia  - Patient w/ multiple nonspecific complaints including testicular pain for the past few weeks now progressed to LBP along with HA, weakness, photophobia, cold sweats, chills, abdominal pain   - CT head: unremarkable  - CT chest: No pulmonary consolidation.  - CT abdomen: No bowel obstruction or inflammation. Distended fluid-filled loops of small bowel in the central abdomen which is nonspecific, but could be seen in the setting of a gastroenteritis. Circumferential urinary bladder wall thickening which could be due to underdistention versus a cystitis.   - UA: occasional bacteria, no nitrites, no wbcs,   - RVP/COVID negative  - MR brain and MR lumbar spine without source  - testicular ultrasound neg  - patient refused LP in the ed however is agreeable to LP now 3/28  - continue vancomycin and rocephin  - pain medications as ordered  - HIV, tick panel pending  - fu cultures   - ID consult Dr. Whitney f/u recs  - Neurology Dr. Appiah consulted
Patient met SIRS criteria on admission: leukocytosis and tachycardia  - Patient w/ multiple nonspecific complaints including testicular pain for the past few weeks now progressed to LBP along with HA, weakness, photophobia, cold sweats, chills, abdominal pain   - CT head: unremarkable  - CT chest: No pulmonary consolidation.  - CT abdomen: No bowel obstruction or inflammation. Distended fluid-filled loops of small bowel in the central abdomen which is nonspecific, but could be seen in the setting of a gastroenteritis. Circumferential urinary bladder wall thickening which could be due to underdistention versus a cystitis.   - UA: occasional bacteria, no nitrites, no wbcs,   - CXR: no acute infiltrates or consolidations on wet read  - RVP/COVID negative  - etiology of SIRS unclear; differential includes epididmytis vs cystitis vs gastroenteritis vs meningitis  - patient refused LP in the ed   - will cover broadly with vancomycin and rocephin  - d/w neurology- order MRI with and without contrast  - pain medications as ordered  - f/u testicular US   - fu cultures   - ID consult Dr. Whitney f/u recs  - Neurology Dr. Appiah consulted

## 2023-03-28 NOTE — PROGRESS NOTE ADULT - ASSESSMENT
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 Unclear etiology, infectious workup so far unrevealing. MRI head and lumbar spine showed no evidence of infection. Lumbar puncture done today    He has no fever here but did present with leukocytosis of 19. MRI brain without contrast unrevealing. CT chest and A/P reviewed and also without clear explanation of his symptoms. Urinalysis does not appear consistent with UTI, and US without evidence of testicular torsion.    -follow blood and urine cultures  -monitor WBC, LFT's  -suggest HIV testing (patient approved verbally), Lyme and tick serologies, Babesia PCR  -suggest MRI lumbar spine with contrast      Maryana Rodgers MD  Division of Infectious Diseases   Cell [phone] between 8am and 6pm   After 6pm and weekends please call ID service at [phone].          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 Unclear etiology, infectious workup so far unrevealing. MRI head and lumbar spine showed no evidence of infection. Lumbar puncture done today    He has no fever here but did present with leukocytosis of 19. MRI brain without contrast unrevealing. CT chest and A/P reviewed and also without clear explanation of his symptoms. Urinalysis does not appear consistent with UTI, and US without evidence of testicular torsion.    -follow blood and urine cultures  -monitor WBC, LFT's  -follow up CSF bacterial/fungal/AFB cultures, HSV and encephalitis PCR panel, West Nile serology  -follow up HIV test, Lyme and tick serologies, Babesia PCR  -suggest EBV and CMV serologies, RPR, urine chlamydia/GC ESTEFANY    Maryana Rodgers MD  Division of Infectious Diseases   Cell [phone] between 8am and 6pm   After 6pm and weekends please call ID service at [phone].          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 Unclear etiology, infectious workup so far unrevealing. MRI head and lumbar spine showed no evidence of infection. Lumbar puncture done today, initial studies do not appear consistent with bacterial meningitis (CSF cell count 1, glucose 77, protein 33).    Had a fever yesterday but leukocytosis improving. Blood and urine cultures unrevealing. CT chest and A/P reviewed and also without clear explanation of his symptoms. US without evidence of testicular torsion.    -suggest acyclovir IV pending HSV PCR  -continue vancomycin and ceftriaxone pending CSF PCR panel  -follow blood and urine cultures  -monitor WBC, LFT's  -follow up CSF bacterial/fungal/AFB cultures, HSV and encephalitis PCR panel, West Nile serology  -follow up HIV test, Lyme and tick serologies, Babesia PCR  -suggest EBV and CMV serologies, RPR, urine chlamydia/GC ESTEFANY  -discontinue droplet precautions    Maryana Rodgers MD  Division of Infectious Diseases   Cell [phone] between 8am and 6pm   After 6pm and weekends please call ID service at [phone].

## 2023-03-28 NOTE — PROGRESS NOTE ADULT - SUBJECTIVE AND OBJECTIVE BOX
Neurology follow up note    LUCERO [id number]      Interval History:    Patient feels on and off chills fevers body and head pain     Allergies    No Known Allergies    Intolerances        MEDICATIONS    acetaminophen     Tablet .. 650 milliGRAM(s) Oral every 6 hours PRN  acyclovir IVPB      acyclovir IVPB 650 milliGRAM(s) IV Intermittent once  acyclovir IVPB 650 milliGRAM(s) IV Intermittent every 8 hours  aluminum hydroxide/magnesium hydroxide/simethicone Suspension 30 milliLiter(s) Oral every 4 hours PRN  atorvastatin 40 milliGRAM(s) Oral at bedtime  bisacodyl 5 milliGRAM(s) Oral every 12 hours PRN  cefTRIAXone   IVPB 2000 milliGRAM(s) IV Intermittent every 12 hours  famotidine    Tablet 20 milliGRAM(s) Oral daily  finasteride 5 milliGRAM(s) Oral daily  lidocaine 2% Injectable 1 milliLiter(s) Local Injection once  melatonin 3 milliGRAM(s) Oral at bedtime PRN  morphine  - Injectable 2 milliGRAM(s) IV Push every 6 hours PRN  ondansetron Injectable 4 milliGRAM(s) IV Push every 8 hours PRN  oxyCODONE    IR 5 milliGRAM(s) Oral every 6 hours PRN  polyethylene glycol 3350 17 Gram(s) Oral daily PRN  senna 2 Tablet(s) Oral at bedtime  simethicone 80 milliGRAM(s) Chew two times a day PRN  vancomycin  IVPB 1000 milliGRAM(s) IV Intermittent every 12 hours              Vital Signs Last 24 Hrs  T(C): 37.2 (28 Mar 2023 12:00), Max: 38.7 (27 Mar 2023 15:54)  T(F): 99 (28 Mar 2023 12:00), Max: 101.7 (27 Mar 2023 15:54)  HR: 90 (28 Mar 2023 12:00) (86 - 99)  BP: 111/70 (28 Mar 2023 12:00) (105/66 - 111/70)  BP(mean): --  RR: 17 (28 Mar 2023 12:00) (17 - 18)  SpO2: 96% (28 Mar 2023 12:00) (95% - 97%)    Parameters below as of 28 Mar 2023 12:00  Patient On (Oxygen Delivery Method): room air    EVIEW OF SYSTEMS:  Constitutional:  Positive history of fevers for the last few days.  Positive history of generalized pain throughout his body and his head.  Eyes:  No double vision or blurry vision.  Ears:  No ringing in the ears.  Neck:  Positive history of neck pain for last few days.  Respiratory:  No shortness of breath.  Cardiovascular:  No chest pain.  Abdomen:  Positive episodes of abdominal pain.  Extremities/Neurological:  No numbness or tingling.  Musculoskeletal:  Positive history of joint pain diffusely throughout his body.  General:  Positive history of back pain.  PHYSICAL EXAMINATION:   HEENT:  Head:  Normocephalic, atraumatic.  Eyes:  No scleral icterus.  Ears:  Hearing bilaterally intact.  NECK:  Supple.  RESPIRATORY:  Good air entry bilaterally.  CARDIOVASCULAR:  S1 and S2 heard.  ABDOMEN:  Soft and nontender.  EXTREMITIES:  No clubbing or cyanosis was noted.      NEUROLOGIC:  The patient is awake, alert, and oriented x3.  Extraocular movements were intact.  Speech was fluent.  Smile symmetric.  Motor:  Bilateral upper and lower 5/5.  Sensory:  Bilateral upper and lower intact to light touch.  No drift.  General:  Upon palpating his lower back, he complains of severe pain.                  LABS:  CBC Full  -  ( 28 Mar 2023 09:25 )  WBC Count : 13.78 K/uL  RBC Count : 4.52 M/uL  Hemoglobin : 13.3 g/dL  Hematocrit : 38.6 %  Platelet Count - Automated : 146 K/uL  Mean Cell Volume : 85.4 fl  Mean Cell Hemoglobin : 29.4 pg  Mean Cell Hemoglobin Concentration : 34.5 gm/dL  Auto Neutrophil # : x  Auto Lymphocyte # : x  Auto Monocyte # : x  Auto Eosinophil # : x  Auto Basophil # : x  Auto Neutrophil % : x  Auto Lymphocyte % : x  Auto Monocyte % : x  Auto Eosinophil % : x  Auto Basophil % : x    Urinalysis Basic - ( 27 Mar 2023 04:13 )    Color: Yellow / Appearance: Clear / S.020 / pH: x  Gluc: x / Ketone: Moderate  / Bili: Negative / Urobili: Negative   Blood: x / Protein: Negative / Nitrite: Negative   Leuk Esterase: Negative / RBC: 0-2 /HPF / WBC 0-2   Sq Epi: x / Non Sq Epi: Negative / Bacteria: Occasional          138  |  107  |  9   ----------------------------<  112<H>  3.4<L>   |  25  |  1.00    Ca    8.9      28 Mar 2023 07:37    TPro  7.4  /  Alb  3.6  /  TBili  1.0  /  DBili  x   /  AST  78<H>  /  ALT  68  /  AlkPhos  62  03-    Hemoglobin A1C:     LIVER FUNCTIONS - ( 27 Mar 2023 07:00 )  Alb: 3.6 g/dL / Pro: 7.4 g/dL / ALK PHOS: 62 U/L / ALT: 68 U/L / AST: 78 U/L / GGT: x           Vitamin B12   PT/INR - ( 28 Mar 2023 09:25 )   PT: 15.6 sec;   INR: 1.33 ratio         PTT - ( 28 Mar 2023 09:25 )  PTT:36.2 sec      RADIOLOGY    ANALYSIS AND PLAN:  This is a 32-year-old with a history of back pain, testicular pain, generalized body pain, and headaches with fevers.  For episode of generalized body aches and headaches, suspect most likely secondary to fever, at present unclear etiology.  The patient did have a history of body pain in the past for which he received stem cell transplant, unclear what type of phenomenon he had at that time.  I will recommend to start with a vasculitis workup. MRI imaging of lumbar spine negative .  The patient is currently on antibiotics, questionable this could be any type of viral syndrome the patient could be exhibiting and any type of fevers can cause headaches with generalized body pain.   Antibiotics as needed.  Infectious Disease followup.  Spoke with the patient, as there is no clear etiology for his symptoms, will plan for a spinal tap.  He understands this.      Greater than 45 minutes of time was spent with the patient, plan of care, reviewing data, with greater than 50% of the visit was spent counseling and/or coordinating care with multidisciplinary healthcare team

## 2023-03-28 NOTE — PROGRESS NOTE ADULT - SUBJECTIVE AND OBJECTIVE BOX
[facility]  INFECTIOUS DISEASES - Max Whitney, [place] [address]  Tel: [phone]     Fax: [phone]  =======================================================    LUCERO SEGURA [id number]    Follow up: Tmax of 101.7 yesterday. Still with some headache. Ambulating in the room.    Allergies:  No Known Allergies      Antibiotics:  acetaminophen     Tablet .. 650 milliGRAM(s) Oral every 6 hours PRN  acyclovir IVPB      acyclovir IVPB 650 milliGRAM(s) IV Intermittent every 8 hours  aluminum hydroxide/magnesium hydroxide/simethicone Suspension 30 milliLiter(s) Oral every 4 hours PRN  atorvastatin 40 milliGRAM(s) Oral at bedtime  bisacodyl 5 milliGRAM(s) Oral every 12 hours PRN  cefTRIAXone   IVPB 2000 milliGRAM(s) IV Intermittent every 12 hours  famotidine    Tablet 20 milliGRAM(s) Oral daily  finasteride 5 milliGRAM(s) Oral daily  melatonin 3 milliGRAM(s) Oral at bedtime PRN  morphine  - Injectable 2 milliGRAM(s) IV Push every 6 hours PRN  ondansetron Injectable 4 milliGRAM(s) IV Push every 8 hours PRN  oxyCODONE    IR 5 milliGRAM(s) Oral every 6 hours PRN  polyethylene glycol 3350 17 Gram(s) Oral daily PRN  senna 2 Tablet(s) Oral at bedtime  simethicone 80 milliGRAM(s) Chew two times a day PRN  vancomycin  IVPB 1000 milliGRAM(s) IV Intermittent every 12 hours       REVIEW OF SYSTEMS:  CONSTITUTIONAL:  (+) fever  HEENT:  No sore throat or runny nose.  CARDIOVASCULAR:  No chest pain or SOB.  RESPIRATORY:  No cough, shortness of breath  GASTROINTESTINAL:  No nausea, vomiting or diarrhea.  GENITOURINARY:  No dysuria, frequency or urgency  MUSCULOSKELETAL:  (+) low back pain  NEUROLOGIC: see history  PSYCHIATRIC:  No disorder of thought or mood.     Physical Exam:  ICU Vital Signs Last 24 Hrs  T(C): 37.2 (28 Mar 2023 12:00), Max: 38.7 (27 Mar 2023 15:54)  T(F): 99 (28 Mar 2023 12:00), Max: 101.7 (27 Mar 2023 15:54)  HR: 90 (28 Mar 2023 12:00) (86 - 99)  BP: 111/70 (28 Mar 2023 12:00) (105/66 - 111/70)  BP(mean): --  ABP: --  ABP(mean): --  RR: 17 (28 Mar 2023 12:00) (17 - 18)  SpO2: 96% (28 Mar 2023 12:00) (95% - 97%)    O2 Parameters below as of 28 Mar 2023 12:00  Patient On (Oxygen Delivery Method): room air    GEN: NAD  HEENT: normocephalic and atraumatic.   NECK: Supple.   LUNGS: Clear to auscultation.  HEART: Regular rate and rhythm   ABDOMEN: Soft, nontender, and nondistended.    EXTREMITIES: No leg edema. no knee swelling  NEUROLOGIC: AO x 3  PSYCHIATRIC: Appropriate affect .        Labs:      138  |  107  |  9   ----------------------------<  112<H>  3.4<L>   |  25  |  1.00    Ca    8.9      28 Mar 2023 07:37    TPro  7.4  /  Alb  3.6  /  TBili  1.0  /  DBili  x   /  AST  78<H>  /  ALT  68  /  AlkPhos  62                            13.3   13.78 )-----------( 146      ( 28 Mar 2023 09:25 )             38.6     PT/INR - ( 28 Mar 2023 09:25 )   PT: 15.6 sec;   INR: 1.33 ratio         PTT - ( 28 Mar 2023 09:25 )  PTT:36.2 sec  Urinalysis Basic - ( 27 Mar 2023 04:13 )    Color: Yellow / Appearance: Clear / S.020 / pH: x  Gluc: x / Ketone: Moderate  / Bili: Negative / Urobili: Negative   Blood: x / Protein: Negative / Nitrite: Negative   Leuk Esterase: Negative / RBC: 0-2 /HPF / WBC 0-2   Sq Epi: x / Non Sq Epi: Negative / Bacteria: Occasional      LIVER FUNCTIONS - ( 27 Mar 2023 07:00 )  Alb: 3.6 g/dL / Pro: 7.4 g/dL / ALK PHOS: 62 U/L / ALT: 68 U/L / AST: 78 U/L / GGT: x             RECENT CULTURES:   @ 04:14 Clean Catch Clean Catch (Midstream)     <10,000 CFU/mL Normal Urogenital Judith         @ 02:49          NotDetec   @ 02:25 .Blood Blood     No growth to date.         @ 02:15 .Blood Blood     No growth to date.              All imaging and data are reviewed.       MRI lumbar spine:  Loss of the normal lumbar lordosis is seen.    The vertebral body height alignment.    Hemangioma is seen involving the L5 vertebral body.    Small nonenhancing focus of T2 shortening is seen involving the T11   vertebral body. This finding is nonspecific and could be compatible with   a bone island though continued close interval follow-up is recommended to   ensure stability and rule out underlying neoplasm.    The disc spaces appear preserved.    T11-12: Small right parasagittal disc protrusion is seen without   significant compromise of the spinal canal or either neural foramen    T12-L1: Normal.    L1-2: Normal.    L2-3: Normal.    L3-4: Normal.    L4-5: Normal.    L5-S1: Normal    The conus ends at the top of L2 and appears normal    No abnormal enhancement is seen.    Evaluation of the paraspinal soft tissues appear unremarkable    IMPRESSION: Nonspecific focus of T2 shortening involving the T11   vertebral body as described above.    No abnormal soft tissue mass or collections are seen.    No abnormal areas of enhancement seen.     [facility]  INFECTIOUS DISEASES - Max Whitney, [place] [address]  Tel: [phone]     Fax: [phone]  =======================================================    LUCERO SEGURA [id number]    Follow up: Tmax of 101.7 yesterday. Still with some headache. Says he is ambulating in the room.    Allergies:  No Known Allergies      Antibiotics:  acetaminophen     Tablet .. 650 milliGRAM(s) Oral every 6 hours PRN  acyclovir IVPB      acyclovir IVPB 650 milliGRAM(s) IV Intermittent every 8 hours  aluminum hydroxide/magnesium hydroxide/simethicone Suspension 30 milliLiter(s) Oral every 4 hours PRN  atorvastatin 40 milliGRAM(s) Oral at bedtime  bisacodyl 5 milliGRAM(s) Oral every 12 hours PRN  cefTRIAXone   IVPB 2000 milliGRAM(s) IV Intermittent every 12 hours  famotidine    Tablet 20 milliGRAM(s) Oral daily  finasteride 5 milliGRAM(s) Oral daily  melatonin 3 milliGRAM(s) Oral at bedtime PRN  morphine  - Injectable 2 milliGRAM(s) IV Push every 6 hours PRN  ondansetron Injectable 4 milliGRAM(s) IV Push every 8 hours PRN  oxyCODONE    IR 5 milliGRAM(s) Oral every 6 hours PRN  polyethylene glycol 3350 17 Gram(s) Oral daily PRN  senna 2 Tablet(s) Oral at bedtime  simethicone 80 milliGRAM(s) Chew two times a day PRN  vancomycin  IVPB 1000 milliGRAM(s) IV Intermittent every 12 hours       REVIEW OF SYSTEMS:  CONSTITUTIONAL:  (+) fever  HEENT:  No sore throat or runny nose.  CARDIOVASCULAR:  No chest pain or SOB.  RESPIRATORY:  No cough, shortness of breath  GASTROINTESTINAL:  No nausea, vomiting or diarrhea.  GENITOURINARY:  No dysuria, frequency or urgency  MUSCULOSKELETAL:  (+) low back pain  NEUROLOGIC: see history  PSYCHIATRIC:  No disorder of thought or mood.     Physical Exam:  ICU Vital Signs Last 24 Hrs  T(C): 37.2 (28 Mar 2023 12:00), Max: 38.7 (27 Mar 2023 15:54)  T(F): 99 (28 Mar 2023 12:00), Max: 101.7 (27 Mar 2023 15:54)  HR: 90 (28 Mar 2023 12:00) (86 - 99)  BP: 111/70 (28 Mar 2023 12:00) (105/66 - 111/70)  BP(mean): --  ABP: --  ABP(mean): --  RR: 17 (28 Mar 2023 12:00) (17 - 18)  SpO2: 96% (28 Mar 2023 12:00) (95% - 97%)    O2 Parameters below as of 28 Mar 2023 12:00  Patient On (Oxygen Delivery Method): room air    GEN: NAD  HEENT: normocephalic and atraumatic.   NECK: Supple.   LUNGS: Clear to auscultation.  HEART: Regular rate and rhythm   ABDOMEN: Soft, nontender, and nondistended.    EXTREMITIES: No leg edema. no knee swelling  NEUROLOGIC: AO x 3  PSYCHIATRIC: Appropriate affect .        Labs:      138  |  107  |  9   ----------------------------<  112<H>  3.4<L>   |  25  |  1.00    Ca    8.9      28 Mar 2023 07:37    TPro  7.4  /  Alb  3.6  /  TBili  1.0  /  DBili  x   /  AST  78<H>  /  ALT  68  /  AlkPhos  62                            13.3   13.78 )-----------( 146      ( 28 Mar 2023 09:25 )             38.6     PT/INR - ( 28 Mar 2023 09:25 )   PT: 15.6 sec;   INR: 1.33 ratio         PTT - ( 28 Mar 2023 09:25 )  PTT:36.2 sec  Urinalysis Basic - ( 27 Mar 2023 04:13 )    Color: Yellow / Appearance: Clear / S.020 / pH: x  Gluc: x / Ketone: Moderate  / Bili: Negative / Urobili: Negative   Blood: x / Protein: Negative / Nitrite: Negative   Leuk Esterase: Negative / RBC: 0-2 /HPF / WBC 0-2   Sq Epi: x / Non Sq Epi: Negative / Bacteria: Occasional      LIVER FUNCTIONS - ( 27 Mar 2023 07:00 )  Alb: 3.6 g/dL / Pro: 7.4 g/dL / ALK PHOS: 62 U/L / ALT: 68 U/L / AST: 78 U/L / GGT: x             RECENT CULTURES:   @ 04:14 Clean Catch Clean Catch (Midstream)     <10,000 CFU/mL Normal Urogenital Judith         @ 02:49          NotDetec   @ 02:25 .Blood Blood     No growth to date.         @ 02:15 .Blood Blood     No growth to date.              All imaging and data are reviewed.       MRI lumbar spine:  Loss of the normal lumbar lordosis is seen.    The vertebral body height alignment.    Hemangioma is seen involving the L5 vertebral body.    Small nonenhancing focus of T2 shortening is seen involving the T11   vertebral body. This finding is nonspecific and could be compatible with   a bone island though continued close interval follow-up is recommended to   ensure stability and rule out underlying neoplasm.    The disc spaces appear preserved.    T11-12: Small right parasagittal disc protrusion is seen without   significant compromise of the spinal canal or either neural foramen    T12-L1: Normal.    L1-2: Normal.    L2-3: Normal.    L3-4: Normal.    L4-5: Normal.    L5-S1: Normal    The conus ends at the top of L2 and appears normal    No abnormal enhancement is seen.    Evaluation of the paraspinal soft tissues appear unremarkable    IMPRESSION: Nonspecific focus of T2 shortening involving the T11   vertebral body as described above.    No abnormal soft tissue mass or collections are seen.    No abnormal areas of enhancement seen.

## 2023-03-28 NOTE — PROGRESS NOTE ADULT - SUBJECTIVE AND OBJECTIVE BOX
Patient is a 32y old  Male who presents with a chief complaint of headache, back pain (28 Mar 2023 12:36)      Subjective:  INTERVAL HPI/OVERNIGHT EVENTS: Patient seen and examined at bedside. No overnight events occurred. Patient complains of persistent headache. still has abdominal pain that he attributes to constipation and gas pains.    MEDICATIONS  (STANDING):  acyclovir IVPB      acyclovir IVPB 650 milliGRAM(s) IV Intermittent once  acyclovir IVPB 650 milliGRAM(s) IV Intermittent every 8 hours  atorvastatin 40 milliGRAM(s) Oral at bedtime  cefTRIAXone   IVPB 2000 milliGRAM(s) IV Intermittent every 12 hours  famotidine    Tablet 20 milliGRAM(s) Oral daily  finasteride 5 milliGRAM(s) Oral daily  senna 2 Tablet(s) Oral at bedtime  vancomycin  IVPB 1000 milliGRAM(s) IV Intermittent every 12 hours    MEDICATIONS  (PRN):  acetaminophen     Tablet .. 650 milliGRAM(s) Oral every 6 hours PRN Temp greater or equal to 38C (100.4F), Mild Pain (1 - 3)  aluminum hydroxide/magnesium hydroxide/simethicone Suspension 30 milliLiter(s) Oral every 4 hours PRN Dyspepsia  bisacodyl 5 milliGRAM(s) Oral every 12 hours PRN Constipation  melatonin 3 milliGRAM(s) Oral at bedtime PRN Insomnia  morphine  - Injectable 2 milliGRAM(s) IV Push every 6 hours PRN Severe Pain (7 - 10)  ondansetron Injectable 4 milliGRAM(s) IV Push every 8 hours PRN Nausea and/or Vomiting  oxyCODONE    IR 5 milliGRAM(s) Oral every 6 hours PRN Moderate Pain (4 - 6)  polyethylene glycol 3350 17 Gram(s) Oral daily PRN Constipation  simethicone 80 milliGRAM(s) Chew two times a day PRN Gas      Allergies    No Known Allergies    Intolerances        REVIEW OF SYSTEMS:  CONSTITUTIONAL: No fever or chills  HEENT:  +headache  RESPIRATORY: No cough, wheezing, or shortness of breath  CARDIOVASCULAR: No chest pain, palpitations  GASTROINTESTINAL: +abdominal pain, no n/v/d  GENITOURINARY: No dysuria, frequency, or hematuria  NEUROLOGICAL: no focal weakness or dizziness  MUSCULOSKELETAL: +back pain      Objective:  Vital Signs Last 24 Hrs  T(C): 37.2 (28 Mar 2023 12:00), Max: 38.7 (27 Mar 2023 15:54)  T(F): 99 (28 Mar 2023 12:00), Max: 101.7 (27 Mar 2023 15:54)  HR: 90 (28 Mar 2023 12:00) (86 - 99)  BP: 111/70 (28 Mar 2023 12:00) (105/66 - 111/70)  BP(mean): --  RR: 17 (28 Mar 2023 12:00) (17 - 18)  SpO2: 96% (28 Mar 2023 12:00) (95% - 97%)    Parameters below as of 28 Mar 2023 12:00  Patient On (Oxygen Delivery Method): room air        GENERAL: NAD, lying in bed comfortably  HEAD:  Atraumatic, Normocephalic  EYES: EOMI, PERRLA, conjunctiva and sclera clear  ENT: Moist mucous membranes  NECK: Supple, No JVD  CHEST/LUNG: Clear to auscultation bilaterally; No rales, rhonchi, wheezing, or rubs. Unlabored respirations  HEART: Regular rate and rhythm; No murmurs, rubs, or gallops  ABDOMEN: Bowel sounds present; Soft, Nondistended. ttp diffusely. no rebound or guarding  EXTREMITIES:  2+ Peripheral Pulses, brisk capillary refill. No clubbing, cyanosis, or edema  NERVOUS SYSTEM:  Alert & Oriented X3, speech clear. No deficits   MSK: FROM all 4 extremities, full and equal strength  SKIN: No rashes. warm, dry    LABS:                        13.3   13.78 )-----------( 146      ( 28 Mar 2023 09:25 )             38.6     CBC Full  -  ( 28 Mar 2023 09:25 )  WBC Count : 13.78 K/uL  Hemoglobin : 13.3 g/dL  Hematocrit : 38.6 %  Platelet Count - Automated : 146 K/uL  Mean Cell Volume : 85.4 fl  Mean Cell Hemoglobin : 29.4 pg  Mean Cell Hemoglobin Concentration : 34.5 gm/dL  Auto Neutrophil # : x  Auto Lymphocyte # : x  Auto Monocyte # : x  Auto Eosinophil # : x  Auto Basophil # : x  Auto Neutrophil % : x  Auto Lymphocyte % : x  Auto Monocyte % : x  Auto Eosinophil % : x  Auto Basophil % : x    28 Mar 2023 07:37    138    |  107    |  9      ----------------------------<  112    3.4     |  25     |  1.00     Ca    8.9        28 Mar 2023 07:37      PT/INR - ( 28 Mar 2023 09:25 )   PT: 15.6 sec;   INR: 1.33 ratio         PTT - ( 28 Mar 2023 09:25 )  PTT:36.2 sec  Urinalysis Basic - ( 27 Mar 2023 04:13 )    Color: Yellow / Appearance: Clear / S.020 / pH: x  Gluc: x / Ketone: Moderate  / Bili: Negative / Urobili: Negative   Blood: x / Protein: Negative / Nitrite: Negative   Leuk Esterase: Negative / RBC: 0-2 /HPF / WBC 0-2   Sq Epi: x / Non Sq Epi: Negative / Bacteria: Occasional      CAPILLARY BLOOD GLUCOSE            Culture - Urine (collected 23 @ 04:14)  Source: Clean Catch Clean Catch (Midstream)  Final Report (23 @ 06:31):    <10,000 CFU/mL Normal Urogenital Judith    Culture - Blood (collected 23 @ 02:25)  Source: .Blood Blood  Preliminary Report (23 @ 09:03):    No growth to date.    Culture - Blood (collected 23 @ 02:15)  Source: .Blood Blood  Preliminary Report (23 @ 09:03):    No growth to date.        RADIOLOGY & ADDITIONAL TESTS:    Personally reviewed.     Consultant(s) Notes Reviewed:  [x] YES  [ ] NO

## 2023-03-28 NOTE — CARE COORDINATION ASSESSMENT. - NSCAREPROVIDERS_GEN_ALL_CORE_FT
CARE PROVIDERS:  Accepting Physician: Umu Hanson  Administration: Nishi Monae  Administration: Enoc Carrera  Admitting: Umu Hanson  Attending: Umu Hanson  Case Management: Jeison Mejia  Consultant: El Appiah  Consultant: Maryana Rodgers  Covering Team: Steven Arthur  ED Attending: Isidoro Oliver  ED Nurse: Jatinder Burger  ED Nurse 2: Anton Cunningham  Infection Control: Karina Perez  Nurse: Riri May  Ordered: Preposi, Criseldo  Ordered: ADM, User  Ordered: ServiceAccount, SCMMLM  Ordered: ServiceAccount, SCMMLM  Override: Loli Clement  Override: Vidhi Negron  Override: Melodie Severino  Override: Riri May  PCA/Nursing Assistant: Cj Castillo  Primary Team: Lovely Zuniga  Primary Team: Umu Hanson  Registered Dietitian: Radha Arreola// Supp. Assoc.: Yessica Vargas

## 2023-03-28 NOTE — PROGRESS NOTE ADULT - PROBLEM SELECTOR PLAN 2
- Hx of testicular cancer s/p a multitude of surgical procedures as a child, now in remission   - Patient has hx of stem cell scar revision surgery with thigh fat liposuction in [facility] in 2020 with subsequent symptoms of testicular/back pain   - F/u testicular US; previous US in 1/2023 negative   - C/w home medication Finasteride   - F/u ID consult  - Can consider Urology consult pending testicular US results
- Hx of testicular cancer s/p a multitude of surgical procedures as a child, now in remission   - Patient has hx of stem cell scar revision surgery with thigh fat liposuction in [facility] in 2020 with subsequent symptoms of testicular/back pain   - testicular ultrasound reviewed  - C/w home medication Finasteride

## 2023-03-28 NOTE — CARE COORDINATION ASSESSMENT. - NSPASTMEDSURGHISTORY_GEN_ALL_CORE_FT
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 No reoccurences

## 2023-03-28 NOTE — CARE COORDINATION ASSESSMENT. - OTHER PERTINENT DISCHARGE PLANNING INFORMATION:
Discussed role of case management with understanding.  Needs unclear at present although anticipate none.  Patient presents with headache/backpain and is r/o meningitis and other viral causes.  Will continue to follow from a case management perspective.

## 2023-03-29 LAB
ANA TITR SER: NEGATIVE — SIGNIFICANT CHANGE UP
ANION GAP SERPL CALC-SCNC: 4 MMOL/L — LOW (ref 5–17)
AUTO DIFF PNL BLD: NEGATIVE — SIGNIFICANT CHANGE UP
BUN SERPL-MCNC: 7 MG/DL — SIGNIFICANT CHANGE UP (ref 7–23)
C-ANCA SER-ACNC: NEGATIVE — SIGNIFICANT CHANGE UP
CALCIUM SERPL-MCNC: 9.2 MG/DL — SIGNIFICANT CHANGE UP (ref 8.5–10.1)
CHLORIDE SERPL-SCNC: 105 MMOL/L — SIGNIFICANT CHANGE UP (ref 96–108)
CO2 SERPL-SCNC: 29 MMOL/L — SIGNIFICANT CHANGE UP (ref 22–31)
CREAT SERPL-MCNC: 0.89 MG/DL — SIGNIFICANT CHANGE UP (ref 0.5–1.3)
CRYPTOC AG CSF-ACNC: NEGATIVE — SIGNIFICANT CHANGE UP
CSF PCR RESULT: SIGNIFICANT CHANGE UP
EGFR: 116 ML/MIN/1.73M2 — SIGNIFICANT CHANGE UP
GLUCOSE SERPL-MCNC: 109 MG/DL — HIGH (ref 70–99)
HCT VFR BLD CALC: 37.9 % — LOW (ref 39–50)
HGB BLD-MCNC: 13 G/DL — SIGNIFICANT CHANGE UP (ref 13–17)
LABORATORY COMMENT REPORT: SIGNIFICANT CHANGE UP
LDH CSF L TO P-CCNC: 14 U/L — SIGNIFICANT CHANGE UP
LDH FLD-CCNC: 14 U/L — SIGNIFICANT CHANGE UP
MCHC RBC-ENTMCNC: 29 PG — SIGNIFICANT CHANGE UP (ref 27–34)
MCHC RBC-ENTMCNC: 34.3 GM/DL — SIGNIFICANT CHANGE UP (ref 32–36)
MCV RBC AUTO: 84.6 FL — SIGNIFICANT CHANGE UP (ref 80–100)
NRBC # BLD: 0 /100 WBCS — SIGNIFICANT CHANGE UP (ref 0–0)
P-ANCA SER-ACNC: NEGATIVE — SIGNIFICANT CHANGE UP
PLATELET # BLD AUTO: 177 K/UL — SIGNIFICANT CHANGE UP (ref 150–400)
POTASSIUM SERPL-MCNC: 3.4 MMOL/L — LOW (ref 3.5–5.3)
POTASSIUM SERPL-SCNC: 3.4 MMOL/L — LOW (ref 3.5–5.3)
RBC # BLD: 4.48 M/UL — SIGNIFICANT CHANGE UP (ref 4.2–5.8)
RBC # FLD: 12.6 % — SIGNIFICANT CHANGE UP (ref 10.3–14.5)
SODIUM SERPL-SCNC: 138 MMOL/L — SIGNIFICANT CHANGE UP (ref 135–145)
SOURCE HSV 1/2: SIGNIFICANT CHANGE UP
TSH SERPL-MCNC: 2.95 UIU/ML — SIGNIFICANT CHANGE UP (ref 0.36–3.74)
WBC # BLD: 14.1 K/UL — HIGH (ref 3.8–10.5)
WBC # FLD AUTO: 14.1 K/UL — HIGH (ref 3.8–10.5)

## 2023-03-29 PROCEDURE — 99233 SBSQ HOSP IP/OBS HIGH 50: CPT

## 2023-03-29 RX ORDER — POTASSIUM CHLORIDE 20 MEQ
40 PACKET (EA) ORAL EVERY 4 HOURS
Refills: 0 | Status: COMPLETED | OUTPATIENT
Start: 2023-03-29 | End: 2023-03-29

## 2023-03-29 RX ORDER — ACETAMINOPHEN WITH CODEINE 300MG-30MG
0.5 TABLET ORAL EVERY 6 HOURS
Refills: 0 | Status: DISCONTINUED | OUTPATIENT
Start: 2023-03-29 | End: 2023-04-01

## 2023-03-29 RX ADMIN — FAMOTIDINE 20 MILLIGRAM(S): 10 INJECTION INTRAVENOUS at 11:43

## 2023-03-29 RX ADMIN — Medication 40 MILLIEQUIVALENT(S): at 13:27

## 2023-03-29 RX ADMIN — CEFTRIAXONE 100 MILLIGRAM(S): 500 INJECTION, POWDER, FOR SOLUTION INTRAMUSCULAR; INTRAVENOUS at 05:27

## 2023-03-29 RX ADMIN — MORPHINE SULFATE 2 MILLIGRAM(S): 50 CAPSULE, EXTENDED RELEASE ORAL at 05:50

## 2023-03-29 RX ADMIN — FINASTERIDE 5 MILLIGRAM(S): 5 TABLET, FILM COATED ORAL at 11:43

## 2023-03-29 RX ADMIN — Medication 250 MILLIGRAM(S): at 10:42

## 2023-03-29 RX ADMIN — Medication 263 MILLIGRAM(S): at 10:42

## 2023-03-29 RX ADMIN — Medication 263 MILLIGRAM(S): at 02:20

## 2023-03-29 RX ADMIN — MORPHINE SULFATE 2 MILLIGRAM(S): 50 CAPSULE, EXTENDED RELEASE ORAL at 06:05

## 2023-03-29 RX ADMIN — Medication 40 MILLIEQUIVALENT(S): at 17:53

## 2023-03-29 RX ADMIN — OXYCODONE HYDROCHLORIDE 5 MILLIGRAM(S): 5 TABLET ORAL at 02:54

## 2023-03-29 RX ADMIN — ATORVASTATIN CALCIUM 40 MILLIGRAM(S): 80 TABLET, FILM COATED ORAL at 21:12

## 2023-03-29 RX ADMIN — SENNA PLUS 2 TABLET(S): 8.6 TABLET ORAL at 21:12

## 2023-03-29 RX ADMIN — OXYCODONE HYDROCHLORIDE 5 MILLIGRAM(S): 5 TABLET ORAL at 02:24

## 2023-03-29 RX ADMIN — Medication 250 MILLIGRAM(S): at 11:42

## 2023-03-29 NOTE — PROGRESS NOTE ADULT - SUBJECTIVE AND OBJECTIVE BOX
Neurology follow up note    LUCERO SEGURA32yMale      Interval History:    Patient with whole body pain     Allergies    No Known Allergies    Intolerances        MEDICATIONS    acetaminophen     Tablet .. 650 milliGRAM(s) Oral every 6 hours PRN  acyclovir IVPB      acyclovir IVPB 650 milliGRAM(s) IV Intermittent every 8 hours  aluminum hydroxide/magnesium hydroxide/simethicone Suspension 30 milliLiter(s) Oral every 4 hours PRN  atorvastatin 40 milliGRAM(s) Oral at bedtime  bisacodyl 5 milliGRAM(s) Oral every 12 hours PRN  cefTRIAXone   IVPB 2000 milliGRAM(s) IV Intermittent every 12 hours  famotidine    Tablet 20 milliGRAM(s) Oral daily  finasteride 5 milliGRAM(s) Oral daily  melatonin 3 milliGRAM(s) Oral at bedtime PRN  morphine  - Injectable 2 milliGRAM(s) IV Push every 6 hours PRN  ondansetron Injectable 4 milliGRAM(s) IV Push every 8 hours PRN  oxyCODONE    IR 5 milliGRAM(s) Oral every 6 hours PRN  polyethylene glycol 3350 17 Gram(s) Oral daily PRN  senna 2 Tablet(s) Oral at bedtime  simethicone 80 milliGRAM(s) Chew two times a day PRN  vancomycin  IVPB 1000 milliGRAM(s) IV Intermittent every 12 hours              Vital Signs Last 24 Hrs  T(C): 36.7 (29 Mar 2023 05:33), Max: 38 (28 Mar 2023 20:55)  T(F): 98 (29 Mar 2023 05:33), Max: 100.4 (28 Mar 2023 20:55)  HR: 73 (29 Mar 2023 05:33) (73 - 95)  BP: 111/68 (29 Mar 2023 05:33) (111/68 - 117/76)  BP(mean): --  RR: 18 (29 Mar 2023 05:33) (17 - 18)  SpO2: 96% (29 Mar 2023 05:33) (96% - 96%)    Parameters below as of 29 Mar 2023 05:33  Patient On (Oxygen Delivery Method): room air        REVIEW OF SYSTEMS:  Constitutional:  Positive history of feeling fevers Positive history of generalized pain throughout his body and his head.  Eyes:  No double vision or blurry vision.  Ears:  No ringing in the ears.  Neck:  Positive history of neck pain for last few days.  Respiratory:  No shortness of breath.  Cardiovascular:  No chest pain.  Abdomen:  Positive episodes of abdominal pain.  Extremities/Neurological:  No numbness or tingling.  Musculoskeletal:  Positive history of joint pain diffusely throughout his body.  General:  Positive history of back pain.  PHYSICAL EXAMINATION:   HEENT:  Head:  Normocephalic, atraumatic.  Eyes:  No scleral icterus.  Ears:  Hearing bilaterally intact.  NECK:  Supple.  RESPIRATORY:  Good air entry bilaterally.  CARDIOVASCULAR:  S1 and S2 heard.  ABDOMEN:  Soft and nontender.  EXTREMITIES:  No clubbing or cyanosis was noted.      NEUROLOGIC:  The patient is awake, alert, and oriented x3.  Extraocular movements were intact.  Speech was fluent.  Smile symmetric.  Motor:  Bilateral upper and lower 5/5.  Sensory:  Bilateral upper and lower intact to light touch.  No drift.  General:  Upon palpating his lower back, he complains of severe pain.                LABS:  CBC Full  -  ( 29 Mar 2023 06:40 )  WBC Count : 14.10 K/uL  RBC Count : 4.48 M/uL  Hemoglobin : 13.0 g/dL  Hematocrit : 37.9 %  Platelet Count - Automated : 177 K/uL  Mean Cell Volume : 84.6 fl  Mean Cell Hemoglobin : 29.0 pg  Mean Cell Hemoglobin Concentration : 34.3 gm/dL  Auto Neutrophil # : x  Auto Lymphocyte # : x  Auto Monocyte # : x  Auto Eosinophil # : x  Auto Basophil # : x  Auto Neutrophil % : x  Auto Lymphocyte % : x  Auto Monocyte % : x  Auto Eosinophil % : x  Auto Basophil % : x      03-29    138  |  105  |  7   ----------------------------<  109<H>  3.4<L>   |  29  |  0.89    Ca    9.2      29 Mar 2023 06:40      Hemoglobin A1C:       Vitamin B12   PT/INR - ( 28 Mar 2023 09:25 )   PT: 15.6 sec;   INR: 1.33 ratio         PTT - ( 28 Mar 2023 09:25 )  PTT:36.2 sec      RADIOLOGY    ANALYSIS AND PLAN:  This is a 32-year-old with a history of back pain, testicular pain, generalized body pain, and headaches with fevers.  For episode of generalized body aches and headaches, suspect most likely secondary to fever, at present unclear etiology.  The patient did have a history of body pain in the past for which he received stem cell transplant, unclear what type of phenomenon he had at that time. MRI imaging of lumbar spine negative .  The patient is currently on antibiotics, questionable this could be any type of viral syndrome the patient could be exhibiting and any type of fevers can cause headaches with generalized body pain.   Antibiotics as needed.  Infectious Disease followup.  CSF WBC 0  vasculitis work up underway       Greater than 45 minutes of time was spent with the patient, plan of care, reviewing data, with greater than 50% of the visit was spent counseling and/or coordinating care with multidisciplinary healthcare team

## 2023-03-29 NOTE — PROGRESS NOTE ADULT - SUBJECTIVE AND OBJECTIVE BOX
Patient is a 32y old  Male who presents with a chief complaint of headache, back pain (29 Mar 2023 08:59)      Subjective:  INTERVAL HPI/OVERNIGHT EVENTS: Patient seen and examined at bedside.  Patient c/o throbbing frontal and  occipital intermittent headache, feel better if the lights are off, fever 100.4 last night with chills and sweating, poor sleep at night due to pain , lower back pain and feels radiating to the abd.   MEDICATIONS  (STANDING):  acyclovir IVPB      acyclovir IVPB 650 milliGRAM(s) IV Intermittent every 8 hours  atorvastatin 40 milliGRAM(s) Oral at bedtime  cefTRIAXone   IVPB 2000 milliGRAM(s) IV Intermittent every 12 hours  famotidine    Tablet 20 milliGRAM(s) Oral daily  finasteride 5 milliGRAM(s) Oral daily  senna 2 Tablet(s) Oral at bedtime  vancomycin  IVPB 1000 milliGRAM(s) IV Intermittent every 12 hours    MEDICATIONS  (PRN):  acetaminophen     Tablet .. 650 milliGRAM(s) Oral every 6 hours PRN Temp greater or equal to 38C (100.4F), Mild Pain (1 - 3)  aluminum hydroxide/magnesium hydroxide/simethicone Suspension 30 milliLiter(s) Oral every 4 hours PRN Dyspepsia  bisacodyl 5 milliGRAM(s) Oral every 12 hours PRN Constipation  melatonin 3 milliGRAM(s) Oral at bedtime PRN Insomnia  naproxen 250 milliGRAM(s) Oral every 6 hours PRN Severe Pain (7 - 10)  ondansetron Injectable 4 milliGRAM(s) IV Push every 8 hours PRN Nausea and/or Vomiting  oxyCODONE    IR 5 milliGRAM(s) Oral every 6 hours PRN Moderate Pain (4 - 6)  polyethylene glycol 3350 17 Gram(s) Oral daily PRN Constipation  simethicone 80 milliGRAM(s) Chew two times a day PRN Gas      Allergies    No Known Allergies    Intolerances        REVIEW OF SYSTEMS:  CONSTITUTIONAL:+ fever or chills  HEENT:  + headache  RESPIRATORY: No cough or shortness of breath  CARDIOVASCULAR: No chest pain or palpitations  GASTROINTESTINAL: + abd pain      Objective:  Vital Signs Last 24 Hrs  T(C): 36.8 (29 Mar 2023 09:37), Max: 38 (28 Mar 2023 20:55)  T(F): 98.3 (29 Mar 2023 09:37), Max: 100.4 (28 Mar 2023 20:55)  HR: 91 (29 Mar 2023 09:37) (73 - 95)  BP: 132/78 (29 Mar 2023 09:37) (111/68 - 132/78)  BP(mean): --  RR: 18 (29 Mar 2023 09:37) (17 - 18)  SpO2: 98% (29 Mar 2023 09:37) (96% - 98%)    Parameters below as of 29 Mar 2023 09:37  Patient On (Oxygen Delivery Method): room air        GENERAL: NAD, sitting in bed   HEAD:  Normocephalic, no tenderness over the head or sinus area.   EYES:  conjunctiva and sclera clear  ENT: Moist mucous membranes, throat normal  NECK: Supple, NO LAD palpated, no neck stiffness    CHEST/LUNG: Clear to auscultation bilaterally; No rales or rhonchi; no wheezing. Unlabored respirations  HEART: Regular rate and rhythm; S1S2+  ABDOMEN: Bowel sounds present; Soft, right abd area +tenderness, Nondistended.   EXTREMITIES:  + distal Peripheral Pulses;  No cyanosis, or edema  NERVOUS SYSTEM:  Alert & Oriented X3;  No gross focal deficits   Back: mild tenderness right next to prior LP area  MSK: moves all extremities  SKIN: No rashes     LABS:                        13.0   14.10 )-----------( 177      ( 29 Mar 2023 06:40 )             37.9     29 Mar 2023 06:40    138    |  105    |  7      ----------------------------<  109    3.4     |  29     |  0.89     Ca    9.2        29 Mar 2023 06:40      PT/INR - ( 28 Mar 2023 09:25 )   PT: 15.6 sec;   INR: 1.33 ratio         PTT - ( 28 Mar 2023 09:25 )  PTT:36.2 sec    CAPILLARY BLOOD GLUCOSE            Culture - Fungal, CSF (collected 03-28-23 @ 13:50)  Source: .CSF CSF  Preliminary Report (03-29-23 @ 06:50):    Testing in progress    Culture - CSF with Gram Stain (collected 03-28-23 @ 13:50)  Source: .CSF CSF  Gram Stain (03-28-23 @ 19:09):    No polymorphonuclear leukocytes seen    No organisms seen    by cytocentrifuge    Culture - Acid Fast - CSF (collected 03-28-23 @ 13:50)  Source: .CSF CSF    Babesia microti PCR, Bld (collected 03-28-23 @ 07:37)    Culture - Urine (collected 03-27-23 @ 04:14)  Source: Clean Catch Clean Catch (Midstream)  Final Report (03-28-23 @ 06:31):    <10,000 CFU/mL Normal Urogenital Judith    Culture - Blood (collected 03-27-23 @ 02:25)  Source: .Blood Blood  Preliminary Report (03-28-23 @ 09:03):    No growth to date.    Culture - Blood (collected 03-27-23 @ 02:15)  Source: .Blood Blood  Preliminary Report (03-28-23 @ 09:03):    No growth to date.        RADIOLOGY & ADDITIONAL TESTS:    Personally reviewed.     Consultant(s) Notes Reviewed:  [x] YES  [ ] NO    Plan of care discussed with patient /family; all questions answered

## 2023-03-29 NOTE — PROGRESS NOTE ADULT - SUBJECTIVE AND OBJECTIVE BOX
[facility]  INFECTIOUS DISEASES - [address], [place] [address]  Tel: [phone]     Fax: [phone]  =======================================================    LUCERO SEGURA [id number]    Follow up: Tmax of 100.4 overnight. Had a rough night 2/2 headache/back pain, but feels better today.    Allergies:  No Known Allergies      Antibiotics:  acetaminophen     Tablet .. 650 milliGRAM(s) Oral every 6 hours PRN  acetaminophen  300 mG/codeine 30 mG 0.5 Tablet(s) Oral every 6 hours PRN  aluminum hydroxide/magnesium hydroxide/simethicone Suspension 30 milliLiter(s) Oral every 4 hours PRN  atorvastatin 40 milliGRAM(s) Oral at bedtime  bisacodyl 5 milliGRAM(s) Oral every 12 hours PRN  famotidine    Tablet 20 milliGRAM(s) Oral daily  finasteride 5 milliGRAM(s) Oral daily  melatonin 3 milliGRAM(s) Oral at bedtime PRN  naproxen 250 milliGRAM(s) Oral every 6 hours PRN  ondansetron Injectable 4 milliGRAM(s) IV Push every 8 hours PRN  polyethylene glycol 3350 17 Gram(s) Oral daily PRN  potassium chloride    Tablet ER 40 milliEquivalent(s) Oral every 4 hours  senna 2 Tablet(s) Oral at bedtime  simethicone 80 milliGRAM(s) Chew two times a day PRN       REVIEW OF SYSTEMS:  CONSTITUTIONAL:  (+) fever  HEENT:  No sore throat or runny nose.  CARDIOVASCULAR:  No chest pain or SOB.  RESPIRATORY:  No cough, shortness of breath  GASTROINTESTINAL:  No nausea, vomiting or diarrhea.  GENITOURINARY:  No dysuria, frequency or urgency  MUSCULOSKELETAL:  (+) low back pain  NEUROLOGIC: see history  PSYCHIATRIC:  No disorder of thought or mood.     Physical Exam:  ICU Vital Signs Last 24 Hrs  T(C): 37.1 (29 Mar 2023 12:12), Max: 38 (28 Mar 2023 20:55)  T(F): 98.8 (29 Mar 2023 12:12), Max: 100.4 (28 Mar 2023 20:55)  HR: 86 (29 Mar 2023 12:12) (73 - 95)  BP: 115/76 (29 Mar 2023 12:12) (111/68 - 132/78)  BP(mean): --  ABP: --  ABP(mean): --  RR: 20 (29 Mar 2023 12:12) (18 - 20)  SpO2: 94% (29 Mar 2023 12:12) (94% - 98%)    O2 Parameters below as of 29 Mar 2023 09:37  Patient On (Oxygen Delivery Method): room air      GEN: NAD  HEENT: normocephalic and atraumatic.   NECK: Supple.   LUNGS: Normal respiratory effort  HEART: Regular rate and rhythm   ABDOMEN: Soft, nontender, and nondistended.    EXTREMITIES: No leg edema. no joint swelling  NEUROLOGIC: AO x 3  PSYCHIATRIC: Appropriate affect .  SKIN: No rash on back/palms/soles; hypopigmented patch on R upper arm (present since August 2022)    Labs:  03-29    138  |  105  |  7   ----------------------------<  109<H>  3.4<L>   |  29  |  0.89    Ca    9.2      29 Mar 2023 06:40                            13.0   14.10 )-----------( 177      ( 29 Mar 2023 06:40 )             37.9     PT/INR - ( 28 Mar 2023 09:25 )   PT: 15.6 sec;   INR: 1.33 ratio         PTT - ( 28 Mar 2023 09:25 )  PTT:36.2 sec        RECENT CULTURES:  03-28 @ 13:55 .CSF Other, CSF       No polymorphonuclear cells seen  No organisms seen  by cytocentrifuge      03-28 @ 13:50 .CSF CSF     No growth    No polymorphonuclear leukocytes seen  No organisms seen  by cytocentrifuge      03-27 @ 04:14 Clean Catch Clean Catch (Midstream)     <10,000 CFU/mL Normal Urogenital Judith        03-27 @ 02:49          NotDetec  03-27 @ 02:25 .Blood Blood     No growth to date.        03-27 @ 02:15 .Blood Blood     No growth to date.              All imaging and data are reviewed.

## 2023-03-29 NOTE — PROGRESS NOTE ADULT - ASSESSMENT
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 Unclear etiology, infectious workup so far unrevealing. MRI head and lumbar spine showed no evidence of infection. Lumbar puncture done and findings not consistent with meningitis/encephalitis (CSF cell count 1, glucose 77, protein 33). HSV and PCR panel are negative.    Fever curve somewhat improved and WBC at 14. HIV negative. Lyme and babesia negative. EMMIE/RF unevealing. Blood cultures remain no growth. CT chest and A/P reviewed and also without clear explanation of his symptoms. US without evidence of testicular torsion.    -discontinue vancomycin, ceftriaxone. acyclovir--observe off antibiotics  -monitor WBC, LFT's, CRP  -follow up CSF bacterial/fungal/AFB cultures, VDRL, West Nile  -follow up tick serologies  -follow up EBV/CMV serologies, RPR, urine chlamydia/GC ESTEFANY    Maryana Rodgers MD  Division of Infectious Diseases   Cell [phone] between 8am and 6pm   After 6pm and weekends please call ID service at [phone].

## 2023-03-29 NOTE — PROGRESS NOTE ADULT - NS ATTEST RISK PROBLEM GEN_ALL_CORE FT
high risk , sepsis of uncertain etiology, fever of unknown source, active symptomatic management, follow up pending labs.
sepsis   r/o meningitis
sepsis   r/o meningitis

## 2023-03-29 NOTE — PROGRESS NOTE ADULT - ASSESSMENT
32y M pmhx renal stones, testicular cancer, presented to the ED c/o headache and low back pain. Patients states he hasn't been "feeling right" for the past few weeks and was considering coming to the hospital. Admitted for sepsis with unclear etiology       Problem/Plan - 1:  ·  Problem: Systemic inflammatory response syndrome (SIRS).   ·  Plan: Patient met SIRS criteria on admission: leukocytosis and tachycardia  - Patient w/ multiple complaints including testicular pain for the past few weeks now progressed to LBP along with HA, weakness, photophobia, cold sweats, chills, abdominal pain   - CT chest: No pulmonary consolidation. right mid lobe small pulmonary nodule   - CT abdomen: No bowel obstruction or inflammation. Distended fluid-filled loops of small bowel in the central abdomen which is nonspecific, but could be seen in the setting of a gastroenteritis. Circumferential urinary bladder wall thickening which could be due to underdistention versus a cystitis. small Left renal stone   - RVP/COVID negative  - MR/CT  brain no acute pathology, MR lumbar spine: Small nonenhancing focus of T2 shortening is seen involving the T11   vertebral body. This finding is nonspecific and could be compatible with a bone island though continued close interval follow-up is recommended to ensure stability and rule out underlying neoplasm.  - testicular ultrasound neg  - continue vancomycin and rocephin  - HIV/lyme serology negative , tick panel pending  - mild elevation of ESR/CRP /RF , cardiolipin Ab negative   -urine/blood cultures negative   -CSF : WBC 0, normal protein and slight elevated CSF TO SERUM GLUCOSE ratio, normal appearance , AFB negative , - gram stain , culture still pending   - ID consult noted: on rocephin and acyclovir    - Neurology Dr. Appiah consult noted     Problem/Plan - 2:  ·  Problem: Testicular pain.   ·  Plan: - Hx of testicular cancer s/p a multitude of surgical procedures as a child, now in remission   - Patient has hx of stem cell scar revision surgery with thigh fat liposuction in [facility] in 2020 with subsequent symptoms of testicular/back pain   - testicular ultrasound reviewed  - C/w home medication Finasteride.     Problem/Plan - 3:  ·  Problem: HLD (hyperlipidemia).   ·  Plan: C/w Atorvastatin.     Problem/Plan - 4:  ·  Problem: Need for prophylactic measure.   ·  Plan: SCDs    headache and body ache:  change pain management to Naproxen , tylenol, oxycodone prn

## 2023-03-30 LAB
ALBUMIN SERPL ELPH-MCNC: 3.6 G/DL — SIGNIFICANT CHANGE UP (ref 3.3–5)
ALP SERPL-CCNC: 116 U/L — SIGNIFICANT CHANGE UP (ref 40–120)
ALT FLD-CCNC: 133 U/L — HIGH (ref 12–78)
ANION GAP SERPL CALC-SCNC: 6 MMOL/L — SIGNIFICANT CHANGE UP (ref 5–17)
AST SERPL-CCNC: 60 U/L — HIGH (ref 15–37)
BILIRUB SERPL-MCNC: 0.6 MG/DL — SIGNIFICANT CHANGE UP (ref 0.2–1.2)
BUN SERPL-MCNC: 9 MG/DL — SIGNIFICANT CHANGE UP (ref 7–23)
CALCIUM SERPL-MCNC: 8.9 MG/DL — SIGNIFICANT CHANGE UP (ref 8.5–10.1)
CHLORIDE SERPL-SCNC: 107 MMOL/L — SIGNIFICANT CHANGE UP (ref 96–108)
CMV IGG FLD QL: >10 U/ML — HIGH
CMV IGG SERPL-IMP: POSITIVE
CMV IGM FLD-ACNC: 36.5 AU/ML — HIGH
CMV IGM SERPL QL: POSITIVE
CO2 SERPL-SCNC: 25 MMOL/L — SIGNIFICANT CHANGE UP (ref 22–31)
CREAT SERPL-MCNC: 1 MG/DL — SIGNIFICANT CHANGE UP (ref 0.5–1.3)
CRP SERPL-MCNC: 65 MG/L — HIGH
EBV EA AB SER IA-ACNC: 127 U/ML — HIGH
EBV EA AB TITR SER IF: POSITIVE
EBV EA IGG SER-ACNC: POSITIVE
EBV NA IGG SER IA-ACNC: 118 U/ML — HIGH
EBV PATRN SPEC IB-IMP: SIGNIFICANT CHANGE UP
EBV VCA IGG AVIDITY SER QL IA: POSITIVE
EBV VCA IGM SER IA-ACNC: 13.5 U/ML — SIGNIFICANT CHANGE UP
EBV VCA IGM SER IA-ACNC: 153 U/ML — HIGH
EBV VCA IGM TITR FLD: NEGATIVE — SIGNIFICANT CHANGE UP
EGFR: 102 ML/MIN/1.73M2 — SIGNIFICANT CHANGE UP
GLUCOSE SERPL-MCNC: 112 MG/DL — HIGH (ref 70–99)
HCT VFR BLD CALC: 42.2 % — SIGNIFICANT CHANGE UP (ref 39–50)
HGB BLD-MCNC: 14.4 G/DL — SIGNIFICANT CHANGE UP (ref 13–17)
MCHC RBC-ENTMCNC: 28.9 PG — SIGNIFICANT CHANGE UP (ref 27–34)
MCHC RBC-ENTMCNC: 34.1 GM/DL — SIGNIFICANT CHANGE UP (ref 32–36)
MCV RBC AUTO: 84.6 FL — SIGNIFICANT CHANGE UP (ref 80–100)
NRBC # BLD: 0 /100 WBCS — SIGNIFICANT CHANGE UP (ref 0–0)
PLATELET # BLD AUTO: 232 K/UL — SIGNIFICANT CHANGE UP (ref 150–400)
POTASSIUM SERPL-MCNC: 3.4 MMOL/L — LOW (ref 3.5–5.3)
POTASSIUM SERPL-SCNC: 3.4 MMOL/L — LOW (ref 3.5–5.3)
PROT SERPL-MCNC: 8.1 G/DL — SIGNIFICANT CHANGE UP (ref 6–8.3)
RBC # BLD: 4.99 M/UL — SIGNIFICANT CHANGE UP (ref 4.2–5.8)
RBC # FLD: 12.3 % — SIGNIFICANT CHANGE UP (ref 10.3–14.5)
SODIUM SERPL-SCNC: 138 MMOL/L — SIGNIFICANT CHANGE UP (ref 135–145)
T PALLIDUM AB TITR SER: NEGATIVE — SIGNIFICANT CHANGE UP
T PALLIDUM AB TITR SER: NEGATIVE — SIGNIFICANT CHANGE UP
WBC # BLD: 6.02 K/UL — SIGNIFICANT CHANGE UP (ref 3.8–10.5)
WBC # FLD AUTO: 6.02 K/UL — SIGNIFICANT CHANGE UP (ref 3.8–10.5)

## 2023-03-30 PROCEDURE — 99233 SBSQ HOSP IP/OBS HIGH 50: CPT

## 2023-03-30 RX ORDER — POTASSIUM CHLORIDE 20 MEQ
40 PACKET (EA) ORAL EVERY 4 HOURS
Refills: 0 | Status: COMPLETED | OUTPATIENT
Start: 2023-03-30 | End: 2023-03-30

## 2023-03-30 RX ADMIN — ATORVASTATIN CALCIUM 40 MILLIGRAM(S): 80 TABLET, FILM COATED ORAL at 21:18

## 2023-03-30 RX ADMIN — Medication 40 MILLIEQUIVALENT(S): at 17:45

## 2023-03-30 RX ADMIN — Medication 40 MILLIEQUIVALENT(S): at 21:18

## 2023-03-30 RX ADMIN — Medication 250 MILLIGRAM(S): at 10:01

## 2023-03-30 RX ADMIN — Medication 250 MILLIGRAM(S): at 12:33

## 2023-03-30 RX ADMIN — Medication 250 MILLIGRAM(S): at 11:33

## 2023-03-30 RX ADMIN — Medication 0.5 TABLET(S): at 13:17

## 2023-03-30 RX ADMIN — Medication 250 MILLIGRAM(S): at 21:18

## 2023-03-30 RX ADMIN — FAMOTIDINE 20 MILLIGRAM(S): 10 INJECTION INTRAVENOUS at 11:32

## 2023-03-30 RX ADMIN — Medication 3 MILLIGRAM(S): at 21:19

## 2023-03-30 RX ADMIN — Medication 250 MILLIGRAM(S): at 11:01

## 2023-03-30 RX ADMIN — Medication 250 MILLIGRAM(S): at 21:48

## 2023-03-30 RX ADMIN — SENNA PLUS 2 TABLET(S): 8.6 TABLET ORAL at 21:18

## 2023-03-30 RX ADMIN — FINASTERIDE 5 MILLIGRAM(S): 5 TABLET, FILM COATED ORAL at 11:33

## 2023-03-30 RX ADMIN — Medication 0.5 TABLET(S): at 14:17

## 2023-03-30 NOTE — PROGRESS NOTE ADULT - ASSESSMENT
32y M pmhx renal stones, testicular cancer, presented to the ED c/o headache and low back pain. Patients states he hasn't been "feeling right" for the past few weeks and was considering coming to the hospital. Admitted for sepsis with unclear etiology       Problem/Plan - 1:  ·  Problem: Systemic inflammatory response syndrome (SIRS).   ·  Plan: Patient met SIRS criteria on admission: leukocytosis and tachycardia  - Patient w/ multiple complaints including testicular pain for the past few weeks now progressed to LBP along with HA, weakness, photophobia, cold sweats, chills, abdominal pain   - CT chest: No pulmonary consolidation. right mid lobe small pulmonary nodule   - CT abdomen: No bowel obstruction or inflammation. Distended fluid-filled loops of small bowel in the central abdomen which is nonspecific, but could be seen in the setting of a gastroenteritis. Circumferential urinary bladder wall thickening which could be due to underdistention versus a cystitis. small Left renal stone   - RVP/COVID negative  - MR/CT  brain no acute pathology, MR lumbar spine: Small nonenhancing focus of T2 shortening is seen involving the T11   vertebral body. This finding is nonspecific and could be compatible with a bone island though continued close interval follow-up is recommended to ensure stability and rule out underlying neoplasm.  - testicular ultrasound neg  - HIV/lyme serology negative   - mild elevation of ESR/CRP /RF , cardiolipin Ab negative   -urine/blood cultures negative   -CSF : WBC 0, normal protein and slight elevated CSF TO SERUM GLUCOSE ratio, normal appearance , AFB negative , - gram stain , culture still pending   - EBV/CMV panel +  - ID consult noted: off antibiotic and antiviral, likely re-activation  EBV   - Neurology Dr. Appaih consult noted     Problem/Plan - 2:  ·  Problem: Testicular pain.   ·  Plan: - Hx of testicular cancer s/p a multitude of surgical procedures as a child, now in remission   - Patient has hx of stem cell scar revision surgery with thigh fat liposuction in [facility] in 2020 with subsequent symptoms of testicular/back pain   - testicular ultrasound reviewed  - C/w home medication Finasteride.     Problem/Plan - 3:  ·  Problem: HLD (hyperlipidemia).   ·  Plan: C/w Atorvastatin.     Problem/Plan - 4:  ·  Problem: Need for prophylactic measure.   ·  Plan: SCDs    headache and body ache:  change pain management to Naproxen , tylenol #3 prn

## 2023-03-30 NOTE — PROGRESS NOTE ADULT - ASSESSMENT
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 Unclear etiology, infectious workup so far unrevealing. MRI head and lumbar spine showed no evidence of infection. Lumbar puncture done and findings not consistent with meningitis/encephalitis (CSF cell count 1, glucose 77, protein 33). CSF HSV and PCR panel are negative.    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 Both CMV IgM/IgG also elevated. He also has some transaminitis which can be seen with EBV. Fevers and leukocytosis resolved, CRP decreasing. HIV and syphilis screen negative. Lyme and babesia negative. EMMIE/RF unevealing. Blood cultures remain no growth. CT chest and A/P showed no lymphadenopathy or signs of infection. US without evidence of testicular torsion.    -observe off antibiotics  -monitor LFT's  -follow up CSF bacterial/fungal/AFB cultures, VDRL, West Nile  -follow up tick serologies  -send urine chlamydia/GC ESTEFANY    Maryana Rodgers MD  Division of Infectious Diseases   Cell [phone] between 8am and 6pm   After 6pm and weekends please call ID service at [phone].

## 2023-03-30 NOTE — PROGRESS NOTE ADULT - SUBJECTIVE AND OBJECTIVE BOX
Patient is a 32y old  Male who presents with a chief complaint of headache, back pain (30 Mar 2023 07:27)      Subjective:  INTERVAL HPI/OVERNIGHT EVENTS: Patient seen and examined at bedside.  Patient states in the morning his symptoms appears slight better, however, he has recurrent headache/back pain/abd pain in the afternoon.   MEDICATIONS  (STANDING):  atorvastatin 40 milliGRAM(s) Oral at bedtime  famotidine    Tablet 20 milliGRAM(s) Oral daily  finasteride 5 milliGRAM(s) Oral daily  potassium chloride    Tablet ER 40 milliEquivalent(s) Oral every 4 hours  senna 2 Tablet(s) Oral at bedtime    MEDICATIONS  (PRN):  acetaminophen     Tablet .. 650 milliGRAM(s) Oral every 6 hours PRN Temp greater or equal to 38C (100.4F), Mild Pain (1 - 3)  acetaminophen  300 mG/codeine 30 mG 0.5 Tablet(s) Oral every 6 hours PRN Moderate Pain (4 - 6)  aluminum hydroxide/magnesium hydroxide/simethicone Suspension 30 milliLiter(s) Oral every 4 hours PRN Dyspepsia  bisacodyl 5 milliGRAM(s) Oral every 12 hours PRN Constipation  melatonin 3 milliGRAM(s) Oral at bedtime PRN Insomnia  naproxen 250 milliGRAM(s) Oral every 6 hours PRN Severe Pain (7 - 10)  ondansetron Injectable 4 milliGRAM(s) IV Push every 8 hours PRN Nausea and/or Vomiting  polyethylene glycol 3350 17 Gram(s) Oral daily PRN Constipation  simethicone 80 milliGRAM(s) Chew two times a day PRN Gas      Allergies    No Known Allergies    Intolerances        REVIEW OF SYSTEMS:  CONSTITUTIONAL: weakness   HEENT:  No headache, no sore throat  RESPIRATORY: No cough or shortness of breath  CARDIOVASCULAR: No chest pain or palpitations  GASTROINTESTINAL: No abd pain, nausea, vomiting, or diarrhea      Objective:  Vital Signs Last 24 Hrs  T(C): 36.8 (30 Mar 2023 13:12), Max: 36.8 (29 Mar 2023 20:08)  T(F): 98.3 (30 Mar 2023 13:12), Max: 98.3 (29 Mar 2023 20:08)  HR: 71 (30 Mar 2023 13:12) (54 - 86)  BP: 143/84 (30 Mar 2023 13:12) (102/65 - 143/84)  BP(mean): --  RR: 18 (30 Mar 2023 13:12) (18 - 18)  SpO2: 99% (30 Mar 2023 13:12) (98% - 99%)    Parameters below as of 30 Mar 2023 13:12  Patient On (Oxygen Delivery Method): room air        GENERAL: NAD, sitting in bed  HEAD:  Normocephalic  EYES:  conjunctiva and sclera clear  ENT: Moist mucous membranes  NECK: Supple  CHEST/LUNG: Clear to auscultation bilaterally; No rales or rhonchi; no wheezing. Unlabored respirations  HEART: Regular rate and rhythm; S1S2+  ABDOMEN: Bowel sounds present; Soft, Nontender, Nondistended.   EXTREMITIES:  + distal Peripheral Pulses;  No cyanosis, or edema  NERVOUS SYSTEM:  Alert & Oriented X3;  No gross focal deficits   MSK: moves all extremities  SKIN: No rashes     LABS:    30 Mar 2023 07:51    138    |  107    |  9      ----------------------------<  112    3.4     |  25     |  1.00     Ca    8.9        30 Mar 2023 07:51    TPro  8.1    /  Alb  3.6    /  TBili  0.6    /  DBili  x      /  AST  60     /  ALT  133    /  AlkPhos  116    30 Mar 2023 07:51        CAPILLARY BLOOD GLUCOSE            Culture - CSF with Gram Stain (collected 03-28-23 @ 13:55)  Source: .CSF Other, CSF  Gram Stain (03-29-23 @ 12:30):    No polymorphonuclear cells seen    No organisms seen    by cytocentrifuge  Preliminary Report (03-30-23 @ 06:46):    No growth    Culture - Fungal, CSF (collected 03-28-23 @ 13:50)  Source: .CSF CSF  Preliminary Report (03-29-23 @ 06:50):    Testing in progress    Culture - CSF with Gram Stain (collected 03-28-23 @ 13:50)  Source: .CSF CSF  Gram Stain (03-28-23 @ 19:09):    No polymorphonuclear leukocytes seen    No organisms seen    by cytocentrifuge  Preliminary Report (03-29-23 @ 15:13):    No growth    Culture - Acid Fast - CSF (collected 03-28-23 @ 13:50)  Source: .CSF CSF    Babesia microti PCR, Bld (collected 03-28-23 @ 07:37)    Culture - Urine (collected 03-27-23 @ 04:14)  Source: Clean Catch Clean Catch (Midstream)  Final Report (03-28-23 @ 06:31):    <10,000 CFU/mL Normal Urogenital Judith    Culture - Blood (collected 03-27-23 @ 02:25)  Source: .Blood Blood  Preliminary Report (03-28-23 @ 09:03):    No growth to date.    Culture - Blood (collected 03-27-23 @ 02:15)  Source: .Blood Blood  Preliminary Report (03-28-23 @ 09:03):    No growth to date.        RADIOLOGY & ADDITIONAL TESTS:    Personally reviewed.     Consultant(s) Notes Reviewed:  [x] YES  [ ] NO    Plan of care discussed with patient /family, discussed with his mother at bedside with patient's permission ; all questions answered

## 2023-03-30 NOTE — PROGRESS NOTE ADULT - SUBJECTIVE AND OBJECTIVE BOX
Neurology follow up note    LUCERO SEGURA32yMale      Interval History:      Patient with whole body pain     Allergies    No Known Allergies    Intolerances        MEDICATIONS    acetaminophen     Tablet .. 650 milliGRAM(s) Oral every 6 hours PRN  acetaminophen  300 mG/codeine 30 mG 0.5 Tablet(s) Oral every 6 hours PRN  aluminum hydroxide/magnesium hydroxide/simethicone Suspension 30 milliLiter(s) Oral every 4 hours PRN  atorvastatin 40 milliGRAM(s) Oral at bedtime  bisacodyl 5 milliGRAM(s) Oral every 12 hours PRN  famotidine    Tablet 20 milliGRAM(s) Oral daily  finasteride 5 milliGRAM(s) Oral daily  melatonin 3 milliGRAM(s) Oral at bedtime PRN  naproxen 250 milliGRAM(s) Oral every 6 hours PRN  ondansetron Injectable 4 milliGRAM(s) IV Push every 8 hours PRN  polyethylene glycol 3350 17 Gram(s) Oral daily PRN  senna 2 Tablet(s) Oral at bedtime  simethicone 80 milliGRAM(s) Chew two times a day PRN              Vital Signs Last 24 Hrs  T(C): 36.4 (30 Mar 2023 05:12), Max: 37.1 (29 Mar 2023 12:12)  T(F): 97.6 (30 Mar 2023 05:12), Max: 98.8 (29 Mar 2023 12:12)  HR: 54 (30 Mar 2023 05:12) (54 - 91)  BP: 102/65 (30 Mar 2023 05:12) (102/65 - 132/78)  BP(mean): --  RR: 18 (30 Mar 2023 05:12) (18 - 20)  SpO2: 98% (30 Mar 2023 05:12) (94% - 98%)    Parameters below as of 30 Mar 2023 05:12  Patient On (Oxygen Delivery Method): room air        REVIEW OF SYSTEMS:  Constitutional:  Positive history of feeling fevers Positive history of generalized pain throughout his body and his head.  Eyes:  No double vision or blurry vision.  Ears:  No ringing in the ears.  Neck:  Positive history of neck pain for last few days.  Respiratory:  No shortness of breath.  Cardiovascular:  No chest pain.  Abdomen:  Positive episodes of abdominal pain.  Extremities/Neurological:  No numbness or tingling.  Musculoskeletal:  Positive history of joint pain diffusely throughout his body.  General:  Positive history of back pain.  PHYSICAL EXAMINATION:   HEENT:  Head:  Normocephalic, atraumatic.  Eyes:  No scleral icterus.  Ears:  Hearing bilaterally intact.  NECK:  Supple.  RESPIRATORY:  Good air entry bilaterally.  CARDIOVASCULAR:  S1 and S2 heard.  ABDOMEN:  Soft and nontender.  EXTREMITIES:  No clubbing or cyanosis was noted.      NEUROLOGIC:  The patient is awake, alert, and oriented x3.  Extraocular movements were intact.  Speech was fluent.  Smile symmetric.  Motor:  Bilateral upper and lower 5/5.  Sensory:  Bilateral upper and lower intact to light touch.  No drift.  General:  Upon palpating his lower back, he complains of severe pain.      LABS:  CBC Full  -  ( 29 Mar 2023 06:40 )  WBC Count : 14.10 K/uL  RBC Count : 4.48 M/uL  Hemoglobin : 13.0 g/dL  Hematocrit : 37.9 %  Platelet Count - Automated : 177 K/uL  Mean Cell Volume : 84.6 fl  Mean Cell Hemoglobin : 29.0 pg  Mean Cell Hemoglobin Concentration : 34.3 gm/dL  Auto Neutrophil # : x  Auto Lymphocyte # : x  Auto Monocyte # : x  Auto Eosinophil # : x  Auto Basophil # : x  Auto Neutrophil % : x  Auto Lymphocyte % : x  Auto Monocyte % : x  Auto Eosinophil % : x  Auto Basophil % : x      03-29    138  |  105  |  7   ----------------------------<  109<H>  3.4<L>   |  29  |  0.89    Ca    9.2      29 Mar 2023 06:40      Hemoglobin A1C:       Vitamin B12   PT/INR - ( 28 Mar 2023 09:25 )   PT: 15.6 sec;   INR: 1.33 ratio         PTT - ( 28 Mar 2023 09:25 )  PTT:36.2 sec      RADIOLOGY      ANALYSIS AND PLAN:  This is a 32-year-old with a history of back pain, testicular pain, generalized body pain, and headaches with fevers.  For episode of generalized body aches and headaches, suspect most likely secondary to fever, at present unclear etiology.  The patient did have a history of body pain in the past for which he received stem cell transplant, unclear what type of phenomenon he had at that time. MRI imaging of lumbar spine negative .  The patient is currently on antibiotics, questionable this could be any type of viral syndrome the patient could be exhibiting and any type of fevers can cause headaches with generalized body pain.   Antibiotics as needed.  Infectious Disease followup.  CSF WBC 0  vasculitis work up underway       Greater than 40 minutes of time was spent with the patient, plan of care, reviewing data, with greater than 50% of the visit was spent counseling and/or coordinating care with multidisciplinary healthcare team   Neurology follow up note    LUCERO SEGURA32yMale      Interval History:      Patient feel overall better     Allergies    No Known Allergies    Intolerances        MEDICATIONS    acetaminophen     Tablet .. 650 milliGRAM(s) Oral every 6 hours PRN  acetaminophen  300 mG/codeine 30 mG 0.5 Tablet(s) Oral every 6 hours PRN  aluminum hydroxide/magnesium hydroxide/simethicone Suspension 30 milliLiter(s) Oral every 4 hours PRN  atorvastatin 40 milliGRAM(s) Oral at bedtime  bisacodyl 5 milliGRAM(s) Oral every 12 hours PRN  famotidine    Tablet 20 milliGRAM(s) Oral daily  finasteride 5 milliGRAM(s) Oral daily  melatonin 3 milliGRAM(s) Oral at bedtime PRN  naproxen 250 milliGRAM(s) Oral every 6 hours PRN  ondansetron Injectable 4 milliGRAM(s) IV Push every 8 hours PRN  polyethylene glycol 3350 17 Gram(s) Oral daily PRN  senna 2 Tablet(s) Oral at bedtime  simethicone 80 milliGRAM(s) Chew two times a day PRN              Vital Signs Last 24 Hrs  T(C): 36.4 (30 Mar 2023 05:12), Max: 37.1 (29 Mar 2023 12:12)  T(F): 97.6 (30 Mar 2023 05:12), Max: 98.8 (29 Mar 2023 12:12)  HR: 54 (30 Mar 2023 05:12) (54 - 91)  BP: 102/65 (30 Mar 2023 05:12) (102/65 - 132/78)  BP(mean): --  RR: 18 (30 Mar 2023 05:12) (18 - 20)  SpO2: 98% (30 Mar 2023 05:12) (94% - 98%)    Parameters below as of 30 Mar 2023 05:12  Patient On (Oxygen Delivery Method): room air        REVIEW OF SYSTEMS:  Constitutional:  Positive history of feeling fevers Positive history of generalized pain throughout his body and his head.  Eyes:  No double vision or blurry vision.  Ears:  No ringing in the ears.  Neck:  Positive history of neck pain for last few days.  Respiratory:  No shortness of breath.  Cardiovascular:  No chest pain.  Abdomen:  Positive episodes of abdominal pain.  Extremities/Neurological:  No numbness or tingling.  Musculoskeletal:  Positive history of joint pain diffusely throughout his body.  General:  Positive history of back pain.  PHYSICAL EXAMINATION:   HEENT:  Head:  Normocephalic, atraumatic.  Eyes:  No scleral icterus.  Ears:  Hearing bilaterally intact.  NECK:  Supple.  RESPIRATORY:  Good air entry bilaterally.  CARDIOVASCULAR:  S1 and S2 heard.  ABDOMEN:  Soft and nontender.  EXTREMITIES:  No clubbing or cyanosis was noted.      NEUROLOGIC:  The patient is awake, alert, and oriented x3.  Extraocular movements were intact.  Speech was fluent.  Smile symmetric.  Motor:  Bilateral upper and lower 5/5.  Sensory:  Bilateral upper and lower intact to light touch.  No drift.  General:  Upon palpating his lower back, he complains of severe pain.      LABS:  CBC Full  -  ( 29 Mar 2023 06:40 )  WBC Count : 14.10 K/uL  RBC Count : 4.48 M/uL  Hemoglobin : 13.0 g/dL  Hematocrit : 37.9 %  Platelet Count - Automated : 177 K/uL  Mean Cell Volume : 84.6 fl  Mean Cell Hemoglobin : 29.0 pg  Mean Cell Hemoglobin Concentration : 34.3 gm/dL  Auto Neutrophil # : x  Auto Lymphocyte # : x  Auto Monocyte # : x  Auto Eosinophil # : x  Auto Basophil # : x  Auto Neutrophil % : x  Auto Lymphocyte % : x  Auto Monocyte % : x  Auto Eosinophil % : x  Auto Basophil % : x      03-29    138  |  105  |  7   ----------------------------<  109<H>  3.4<L>   |  29  |  0.89    Ca    9.2      29 Mar 2023 06:40      Hemoglobin A1C:       Vitamin B12   PT/INR - ( 28 Mar 2023 09:25 )   PT: 15.6 sec;   INR: 1.33 ratio         PTT - ( 28 Mar 2023 09:25 )  PTT:36.2 sec      RADIOLOGY      ANALYSIS AND PLAN:  This is a 32-year-old with a history of back pain, testicular pain, generalized body pain, and headaches with fevers.  For episode of generalized body aches and headaches, suspect most likely secondary to fever, at present unclear etiology.  The patient did have a history of body pain in the past for which he received stem cell transplant, unclear what type of phenomenon he had at that time. MRI imaging of lumbar spine negative .  The patient is currently on antibiotics, questionable this could be any type of viral syndrome the patient could be exhibiting and any type of fevers can cause headaches with generalized body pain.   Antibiotics as needed.  Infectious Disease followup.  CSF WBC 0  vasculitis work up underway   neurologic wise stable       Greater than 40 minutes of time was spent with the patient, plan of care, reviewing data, with greater than 50% of the visit was spent counseling and/or coordinating care with multidisciplinary healthcare team

## 2023-03-30 NOTE — PROGRESS NOTE ADULT - SUBJECTIVE AND OBJECTIVE BOX
[facility]  INFECTIOUS DISEASES - [address], [place] [address]  Tel: [phone]     Fax: [phone]  =======================================================    LUCERO SEGURA [id number]    Follow up: No fevers. Seen earlier today. Said he felt worse. Had pain on lower back, testicle and headache.    Allergies:  No Known Allergies      Antibiotics:  acetaminophen     Tablet .. 650 milliGRAM(s) Oral every 6 hours PRN  acetaminophen  300 mG/codeine 30 mG 0.5 Tablet(s) Oral every 6 hours PRN  aluminum hydroxide/magnesium hydroxide/simethicone Suspension 30 milliLiter(s) Oral every 4 hours PRN  atorvastatin 40 milliGRAM(s) Oral at bedtime  bisacodyl 5 milliGRAM(s) Oral every 12 hours PRN  famotidine    Tablet 20 milliGRAM(s) Oral daily  finasteride 5 milliGRAM(s) Oral daily  melatonin 3 milliGRAM(s) Oral at bedtime PRN  naproxen 250 milliGRAM(s) Oral every 6 hours PRN  ondansetron Injectable 4 milliGRAM(s) IV Push every 8 hours PRN  polyethylene glycol 3350 17 Gram(s) Oral daily PRN  senna 2 Tablet(s) Oral at bedtime  simethicone 80 milliGRAM(s) Chew two times a day PRN       REVIEW OF SYSTEMS:  CONSTITUTIONAL:  No fevers  HEENT:  No sore throat or runny nose. No blurring of vision  CARDIOVASCULAR:  No chest pain or SOB.  RESPIRATORY:  No cough, shortness of breath  GASTROINTESTINAL:  No nausea, vomiting or diarrhea.  GENITOURINARY:  No dysuria, frequency or urgency  MUSCULOSKELETAL:  (+) low back pain  NEUROLOGIC: see history  PSYCHIATRIC:  No disorder of thought or mood.     Physical Exam:  ICU Vital Signs Last 24 Hrs  T(C): 36.2 (31 Mar 2023 04:21), Max: 36.8 (30 Mar 2023 13:12)  T(F): 97.2 (31 Mar 2023 04:21), Max: 98.3 (30 Mar 2023 13:12)  HR: 57 (31 Mar 2023 04:21) (57 - 86)  BP: 142/84 (31 Mar 2023 04:21) (135/84 - 143/84)  BP(mean): --  ABP: --  ABP(mean): --  RR: 18 (31 Mar 2023 04:21) (18 - 18)  SpO2: 98% (31 Mar 2023 04:21) (98% - 99%)    O2 Parameters below as of 31 Mar 2023 04:21  Patient On (Oxygen Delivery Method): room air        GEN: NAD  HEENT: normocephalic and atraumatic.   NECK: Supple.   LUNGS: Normal respiratory effort  HEART: Regular rate and rhythm   ABDOMEN: Soft, nontender, and nondistended.    EXTREMITIES: No leg edema. no joint swelling  NEUROLOGIC: AO x 3  PSYCHIATRIC: Appropriate affect .    Labs:  03-30    138  |  107  |  9   ----------------------------<  112<H>  3.4<L>   |  25  |  1.00    Ca    8.9      30 Mar 2023 07:51    TPro  8.1  /  Alb  3.6  /  TBili  0.6  /  DBili  x   /  AST  60<H>  /  ALT  133<H>  /  AlkPhos  116  03-30                          14.4   6.02  )-----------( 232      ( 30 Mar 2023 16:10 )             42.2         LIVER FUNCTIONS - ( 30 Mar 2023 07:51 )  Alb: 3.6 g/dL / Pro: 8.1 g/dL / ALK PHOS: 116 U/L / ALT: 133 U/L / AST: 60 U/L / GGT: x             RECENT CULTURES:  03-28 @ 13:55 .CSF Other, CSF     No growth    No polymorphonuclear cells seen  No organisms seen  by cytocentrifuge      03-28 @ 13:50 .CSF CSF     No growth    No polymorphonuclear leukocytes seen  No organisms seen  by cytocentrifuge      03-27 @ 04:14 Clean Catch Clean Catch (Midstream)     <10,000 CFU/mL Normal Urogenital Judith        03-27 @ 02:49          NotDetec  03-27 @ 02:25 .Blood Blood     No growth to date.        03-27 @ 02:15 .Blood Blood     No growth to date.              All imaging and data are reviewed.

## 2023-03-31 LAB
A PHAGOCYTOPH IGG TITR SER IF: SIGNIFICANT CHANGE UP TITER
ALBUMIN SERPL ELPH-MCNC: 4 G/DL — SIGNIFICANT CHANGE UP (ref 3.3–5)
ALP SERPL-CCNC: 119 U/L — SIGNIFICANT CHANGE UP (ref 40–120)
ALT FLD-CCNC: 124 U/L — HIGH (ref 12–78)
ANION GAP SERPL CALC-SCNC: 2 MMOL/L — LOW (ref 5–17)
AST SERPL-CCNC: 38 U/L — HIGH (ref 15–37)
B BURGDOR AB SER QL IA: POSITIVE — SIGNIFICANT CHANGE UP
B MICROTI IGG TITR SER: SIGNIFICANT CHANGE UP TITER
BASOPHILS # BLD AUTO: 0.06 K/UL — SIGNIFICANT CHANGE UP (ref 0–0.2)
BASOPHILS NFR BLD AUTO: 1 % — SIGNIFICANT CHANGE UP (ref 0–2)
BILIRUB SERPL-MCNC: 0.6 MG/DL — SIGNIFICANT CHANGE UP (ref 0.2–1.2)
BUN SERPL-MCNC: 17 MG/DL — SIGNIFICANT CHANGE UP (ref 7–23)
C TRACH RRNA SPEC QL NAA+PROBE: SIGNIFICANT CHANGE UP
CALCIUM SERPL-MCNC: 9.9 MG/DL — SIGNIFICANT CHANGE UP (ref 8.5–10.1)
CHLORIDE SERPL-SCNC: 108 MMOL/L — SIGNIFICANT CHANGE UP (ref 96–108)
CO2 SERPL-SCNC: 28 MMOL/L — SIGNIFICANT CHANGE UP (ref 22–31)
CREAT SERPL-MCNC: 1 MG/DL — SIGNIFICANT CHANGE UP (ref 0.5–1.3)
CULTURE RESULTS: NO GROWTH — SIGNIFICANT CHANGE UP
E CHAFFEENSIS IGG TITR SER IF: SIGNIFICANT CHANGE UP TITER
EGFR: 102 ML/MIN/1.73M2 — SIGNIFICANT CHANGE UP
EOSINOPHIL # BLD AUTO: 0.06 K/UL — SIGNIFICANT CHANGE UP (ref 0–0.5)
EOSINOPHIL NFR BLD AUTO: 1 % — SIGNIFICANT CHANGE UP (ref 0–6)
GLUCOSE SERPL-MCNC: 101 MG/DL — HIGH (ref 70–99)
HCT VFR BLD CALC: 45.5 % — SIGNIFICANT CHANGE UP (ref 39–50)
HGB BLD-MCNC: 15.3 G/DL — SIGNIFICANT CHANGE UP (ref 13–17)
LYMPHOCYTES # BLD AUTO: 1.34 K/UL — SIGNIFICANT CHANGE UP (ref 1–3.3)
LYMPHOCYTES # BLD AUTO: 21 % — SIGNIFICANT CHANGE UP (ref 13–44)
MCHC RBC-ENTMCNC: 28.8 PG — SIGNIFICANT CHANGE UP (ref 27–34)
MCHC RBC-ENTMCNC: 33.6 GM/DL — SIGNIFICANT CHANGE UP (ref 32–36)
MCV RBC AUTO: 85.7 FL — SIGNIFICANT CHANGE UP (ref 80–100)
MONOCYTES # BLD AUTO: 0.58 K/UL — SIGNIFICANT CHANGE UP (ref 0–0.9)
MONOCYTES NFR BLD AUTO: 9 % — SIGNIFICANT CHANGE UP (ref 2–14)
N GONORRHOEA RRNA SPEC QL NAA+PROBE: SIGNIFICANT CHANGE UP
NEUTROPHILS # BLD AUTO: 4.35 K/UL — SIGNIFICANT CHANGE UP (ref 1.8–7.4)
NEUTROPHILS NFR BLD AUTO: 68 % — SIGNIFICANT CHANGE UP (ref 43–77)
NRBC # BLD: SIGNIFICANT CHANGE UP /100 WBCS (ref 0–0)
PLATELET # BLD AUTO: 237 K/UL — SIGNIFICANT CHANGE UP (ref 150–400)
POTASSIUM SERPL-MCNC: 4.5 MMOL/L — SIGNIFICANT CHANGE UP (ref 3.5–5.3)
POTASSIUM SERPL-SCNC: 4.5 MMOL/L — SIGNIFICANT CHANGE UP (ref 3.5–5.3)
PROT SERPL-MCNC: 8.6 G/DL — HIGH (ref 6–8.3)
RBC # BLD: 5.31 M/UL — SIGNIFICANT CHANGE UP (ref 4.2–5.8)
RBC # FLD: 12.4 % — SIGNIFICANT CHANGE UP (ref 10.3–14.5)
SODIUM SERPL-SCNC: 138 MMOL/L — SIGNIFICANT CHANGE UP (ref 135–145)
SPECIMEN SOURCE: SIGNIFICANT CHANGE UP
SPECIMEN SOURCE: SIGNIFICANT CHANGE UP
WBC # BLD: 6.39 K/UL — SIGNIFICANT CHANGE UP (ref 3.8–10.5)
WBC # FLD AUTO: 6.39 K/UL — SIGNIFICANT CHANGE UP (ref 3.8–10.5)
WNV IGG CSF IA-ACNC: NEGATIVE — SIGNIFICANT CHANGE UP
WNV IGG TITR FLD: NEGATIVE — SIGNIFICANT CHANGE UP
WNV IGM CSF IA-ACNC: NEGATIVE — SIGNIFICANT CHANGE UP
WNV IGM SPEC QL: NEGATIVE — SIGNIFICANT CHANGE UP

## 2023-03-31 PROCEDURE — 99233 SBSQ HOSP IP/OBS HIGH 50: CPT

## 2023-03-31 PROCEDURE — 99232 SBSQ HOSP IP/OBS MODERATE 35: CPT

## 2023-03-31 RX ADMIN — FINASTERIDE 5 MILLIGRAM(S): 5 TABLET, FILM COATED ORAL at 12:13

## 2023-03-31 RX ADMIN — Medication 3 MILLIGRAM(S): at 21:15

## 2023-03-31 RX ADMIN — FAMOTIDINE 20 MILLIGRAM(S): 10 INJECTION INTRAVENOUS at 12:13

## 2023-03-31 RX ADMIN — Medication 650 MILLIGRAM(S): at 22:09

## 2023-03-31 RX ADMIN — SENNA PLUS 2 TABLET(S): 8.6 TABLET ORAL at 21:10

## 2023-03-31 RX ADMIN — ATORVASTATIN CALCIUM 40 MILLIGRAM(S): 80 TABLET, FILM COATED ORAL at 21:10

## 2023-03-31 RX ADMIN — Medication 250 MILLIGRAM(S): at 20:51

## 2023-03-31 RX ADMIN — Medication 250 MILLIGRAM(S): at 09:59

## 2023-03-31 RX ADMIN — Medication 250 MILLIGRAM(S): at 19:51

## 2023-03-31 RX ADMIN — Medication 650 MILLIGRAM(S): at 21:09

## 2023-03-31 RX ADMIN — Medication 250 MILLIGRAM(S): at 10:59

## 2023-03-31 NOTE — PROGRESS NOTE ADULT - SUBJECTIVE AND OBJECTIVE BOX
Neurology follow up note    LUCERO SEGURA32yMale      Interval History:    Patient feels ok no new complaints.    Allergies    No Known Allergies    Intolerances        MEDICATIONS    acetaminophen     Tablet .. 650 milliGRAM(s) Oral every 6 hours PRN  acetaminophen  300 mG/codeine 30 mG 0.5 Tablet(s) Oral every 6 hours PRN  aluminum hydroxide/magnesium hydroxide/simethicone Suspension 30 milliLiter(s) Oral every 4 hours PRN  atorvastatin 40 milliGRAM(s) Oral at bedtime  bisacodyl 5 milliGRAM(s) Oral every 12 hours PRN  famotidine    Tablet 20 milliGRAM(s) Oral daily  finasteride 5 milliGRAM(s) Oral daily  melatonin 3 milliGRAM(s) Oral at bedtime PRN  naproxen 250 milliGRAM(s) Oral every 6 hours PRN  ondansetron Injectable 4 milliGRAM(s) IV Push every 8 hours PRN  polyethylene glycol 3350 17 Gram(s) Oral daily PRN  senna 2 Tablet(s) Oral at bedtime  simethicone 80 milliGRAM(s) Chew two times a day PRN              Vital Signs Last 24 Hrs  T(C): 36.2 (31 Mar 2023 04:21), Max: 36.8 (30 Mar 2023 13:12)  T(F): 97.2 (31 Mar 2023 04:21), Max: 98.3 (30 Mar 2023 13:12)  HR: 57 (31 Mar 2023 04:21) (57 - 86)  BP: 142/84 (31 Mar 2023 04:21) (135/84 - 143/84)  BP(mean): --  RR: 18 (31 Mar 2023 04:21) (18 - 18)  SpO2: 98% (31 Mar 2023 04:21) (98% - 99%)    Parameters below as of 31 Mar 2023 04:21  Patient On (Oxygen Delivery Method): room air        REVIEW OF SYSTEMS:  Constitutional:  Positive history of feeling fevers Positive history of generalized pain throughout his body and his head.  Eyes:  No double vision or blurry vision.  Ears:  No ringing in the ears.  Neck:  Positive history of neck pain for last few days.  Respiratory:  No shortness of breath.  Cardiovascular:  No chest pain.  Abdomen:  Positive episodes of abdominal pain.  Extremities/Neurological:  No numbness or tingling.  Musculoskeletal:  Positive history of joint pain diffusely throughout his body.  General:  Positive history of back pain.  PHYSICAL EXAMINATION:   HEENT:  Head:  Normocephalic, atraumatic.  Eyes:  No scleral icterus.  Ears:  Hearing bilaterally intact.  NECK:  Supple.  RESPIRATORY:  Good air entry bilaterally.  CARDIOVASCULAR:  S1 and S2 heard.  ABDOMEN:  Soft and nontender.  EXTREMITIES:  No clubbing or cyanosis was noted.      NEUROLOGIC:  The patient is awake, alert, and oriented x3.  Extraocular movements were intact.  Speech was fluent.  Smile symmetric.  Motor:  Bilateral upper and lower 5/5.  Sensory:  Bilateral upper and lower intact to light touch.  No drift.  General:  Upon palpating his lower back, he complains of severe pain.    LABS:  CBC Full  -  ( 30 Mar 2023 16:10 )  WBC Count : 6.02 K/uL  RBC Count : 4.99 M/uL  Hemoglobin : 14.4 g/dL  Hematocrit : 42.2 %  Platelet Count - Automated : 232 K/uL  Mean Cell Volume : 84.6 fl  Mean Cell Hemoglobin : 28.9 pg  Mean Cell Hemoglobin Concentration : 34.1 gm/dL  Auto Neutrophil # : x  Auto Lymphocyte # : x  Auto Monocyte # : x  Auto Eosinophil # : x  Auto Basophil # : x  Auto Neutrophil % : x  Auto Lymphocyte % : x  Auto Monocyte % : x  Auto Eosinophil % : x  Auto Basophil % : x      03-30    138  |  107  |  9   ----------------------------<  112<H>  3.4<L>   |  25  |  1.00    Ca    8.9      30 Mar 2023 07:51    TPro  8.1  /  Alb  3.6  /  TBili  0.6  /  DBili  x   /  AST  60<H>  /  ALT  133<H>  /  AlkPhos  116  03-30    Hemoglobin A1C:     LIVER FUNCTIONS - ( 30 Mar 2023 07:51 )  Alb: 3.6 g/dL / Pro: 8.1 g/dL / ALK PHOS: 116 U/L / ALT: 133 U/L / AST: 60 U/L / GGT: x           Vitamin B12         RADIOLOGY  ANALYSIS AND PLAN:  This is a 32-year-old with a history of back pain, testicular pain, generalized body pain, and headaches with fevers.  For episode of generalized body aches and headaches, suspect most likely secondary to fever, at present unclear etiology.  The patient did have a history of body pain in the past for which he received stem cell transplant, unclear what type of phenomenon he had at that time. MRI imaging of lumbar spine negative .  The patient is currently off antibiotics, questionable this could be any type of viral syndrome the patient could be exhibiting and any type of fevers can cause headaches with generalized body pain.   Antibiotics as needed.  Infectious Disease followup as needed   CSF WBC 0  vasculitis work up underway   EBV infection   neurologic wise stable       Greater than 40 minutes of time was spent with the patient, plan of care, reviewing data, with greater than 50% of the visit was spent counseling and/or coordinating care with multidisciplinary healthcare team     Neurology follow up note    LUCERO SEGURA32yMale      Interval History:    Patient feels on and off pain     Allergies    No Known Allergies    Intolerances        MEDICATIONS    acetaminophen     Tablet .. 650 milliGRAM(s) Oral every 6 hours PRN  acetaminophen  300 mG/codeine 30 mG 0.5 Tablet(s) Oral every 6 hours PRN  aluminum hydroxide/magnesium hydroxide/simethicone Suspension 30 milliLiter(s) Oral every 4 hours PRN  atorvastatin 40 milliGRAM(s) Oral at bedtime  bisacodyl 5 milliGRAM(s) Oral every 12 hours PRN  famotidine    Tablet 20 milliGRAM(s) Oral daily  finasteride 5 milliGRAM(s) Oral daily  melatonin 3 milliGRAM(s) Oral at bedtime PRN  naproxen 250 milliGRAM(s) Oral every 6 hours PRN  ondansetron Injectable 4 milliGRAM(s) IV Push every 8 hours PRN  polyethylene glycol 3350 17 Gram(s) Oral daily PRN  senna 2 Tablet(s) Oral at bedtime  simethicone 80 milliGRAM(s) Chew two times a day PRN              Vital Signs Last 24 Hrs  T(C): 36.2 (31 Mar 2023 04:21), Max: 36.8 (30 Mar 2023 13:12)  T(F): 97.2 (31 Mar 2023 04:21), Max: 98.3 (30 Mar 2023 13:12)  HR: 57 (31 Mar 2023 04:21) (57 - 86)  BP: 142/84 (31 Mar 2023 04:21) (135/84 - 143/84)  BP(mean): --  RR: 18 (31 Mar 2023 04:21) (18 - 18)  SpO2: 98% (31 Mar 2023 04:21) (98% - 99%)    Parameters below as of 31 Mar 2023 04:21  Patient On (Oxygen Delivery Method): room air        REVIEW OF SYSTEMS:  Constitutional:  Positive history of feeling fevers Positive history of generalized pain throughout his body and his head.  Eyes:  No double vision or blurry vision.  Ears:  No ringing in the ears.  Neck:  Positive history of neck pain for last few days.  Respiratory:  No shortness of breath.  Cardiovascular:  No chest pain.  Abdomen:  Positive episodes of abdominal pain.  Extremities/Neurological:  No numbness or tingling.  Musculoskeletal:  Positive history of joint pain diffusely throughout his body.  General:  Positive history of back pain.  PHYSICAL EXAMINATION:   HEENT:  Head:  Normocephalic, atraumatic.  Eyes:  No scleral icterus.  Ears:  Hearing bilaterally intact.  NECK:  Supple.  RESPIRATORY:  Good air entry bilaterally.  CARDIOVASCULAR:  S1 and S2 heard.  ABDOMEN:  Soft and nontender.  EXTREMITIES:  No clubbing or cyanosis was noted.      NEUROLOGIC:  The patient is awake, alert, and oriented x3.  Extraocular movements were intact.  Speech was fluent.  Smile symmetric.  Motor:  Bilateral upper and lower 5/5.  Sensory:  Bilateral upper and lower intact to light touch.  No drift.  General:  Upon palpating his lower back, he complains of severe pain.    LABS:  CBC Full  -  ( 30 Mar 2023 16:10 )  WBC Count : 6.02 K/uL  RBC Count : 4.99 M/uL  Hemoglobin : 14.4 g/dL  Hematocrit : 42.2 %  Platelet Count - Automated : 232 K/uL  Mean Cell Volume : 84.6 fl  Mean Cell Hemoglobin : 28.9 pg  Mean Cell Hemoglobin Concentration : 34.1 gm/dL  Auto Neutrophil # : x  Auto Lymphocyte # : x  Auto Monocyte # : x  Auto Eosinophil # : x  Auto Basophil # : x  Auto Neutrophil % : x  Auto Lymphocyte % : x  Auto Monocyte % : x  Auto Eosinophil % : x  Auto Basophil % : x      03-30    138  |  107  |  9   ----------------------------<  112<H>  3.4<L>   |  25  |  1.00    Ca    8.9      30 Mar 2023 07:51    TPro  8.1  /  Alb  3.6  /  TBili  0.6  /  DBili  x   /  AST  60<H>  /  ALT  133<H>  /  AlkPhos  116  03-30    Hemoglobin A1C:     LIVER FUNCTIONS - ( 30 Mar 2023 07:51 )  Alb: 3.6 g/dL / Pro: 8.1 g/dL / ALK PHOS: 116 U/L / ALT: 133 U/L / AST: 60 U/L / GGT: x           Vitamin B12         RADIOLOGY  ANALYSIS AND PLAN:  This is a 32-year-old with a history of back pain, testicular pain, generalized body pain, and headaches with fevers.  For episode of generalized body aches and headaches, suspect most likely secondary to fever, at present unclear etiology.  The patient did have a history of body pain in the past for which he received stem cell transplant, unclear what type of phenomenon he had at that time. MRI imaging of lumbar spine negative .  The patient is currently off antibiotics, questionable this could be any type of viral syndrome the patient could be exhibiting and any type of fevers can cause headaches with generalized body pain.   Antibiotics as needed.  Infectious Disease followup as needed   CSF WBC 0  vasculitis work up underway   EBV infection   neurologic wise stable       Greater than 40 minutes of time was spent with the patient, plan of care, reviewing data, with greater than 50% of the visit was spent counseling and/or coordinating care with multidisciplinary healthcare team

## 2023-03-31 NOTE — PROGRESS NOTE ADULT - SUBJECTIVE AND OBJECTIVE BOX
[facility]  INFECTIOUS DISEASES - [address], [place] [address]  Tel: [phone]     Fax: [phone]  =======================================================    LUCERO SEGURA [id number]    Follow up: No fevers. Seen earlier today. Has occasional headache/low back pain, but overall feels better.    Allergies:  No Known Allergies      Antibiotics:  acetaminophen     Tablet .. 650 milliGRAM(s) Oral every 6 hours PRN  acetaminophen  300 mG/codeine 30 mG 0.5 Tablet(s) Oral every 6 hours PRN  aluminum hydroxide/magnesium hydroxide/simethicone Suspension 30 milliLiter(s) Oral every 4 hours PRN  atorvastatin 40 milliGRAM(s) Oral at bedtime  bisacodyl 5 milliGRAM(s) Oral every 12 hours PRN  famotidine    Tablet 20 milliGRAM(s) Oral daily  finasteride 5 milliGRAM(s) Oral daily  melatonin 3 milliGRAM(s) Oral at bedtime PRN  naproxen 250 milliGRAM(s) Oral every 6 hours PRN  ondansetron Injectable 4 milliGRAM(s) IV Push every 8 hours PRN  polyethylene glycol 3350 17 Gram(s) Oral daily PRN  senna 2 Tablet(s) Oral at bedtime  simethicone 80 milliGRAM(s) Chew two times a day PRN       REVIEW OF SYSTEMS:  CONSTITUTIONAL:  No fevers  HEENT:  No sore throat or runny nose. No blurring of vision  CARDIOVASCULAR:  No chest pain or SOB.  RESPIRATORY:  No cough, shortness of breath  GASTROINTESTINAL:  No nausea, vomiting or diarrhea.  GENITOURINARY:  No dysuria, frequency or urgency  MUSCULOSKELETAL:  (+) low back pain  NEUROLOGIC: see history  PSYCHIATRIC:  No disorder of thought or mood.     Physical Exam:  ICU Vital Signs Last 24 Hrs  T(C): 36.7 (31 Mar 2023 20:20), Max: 36.7 (31 Mar 2023 20:20)  T(F): 98.1 (31 Mar 2023 20:20), Max: 98.1 (31 Mar 2023 20:20)  HR: 78 (31 Mar 2023 20:20) (57 - 97)  BP: 125/80 (31 Mar 2023 20:20) (121/75 - 142/84)  BP(mean): --  ABP: --  ABP(mean): --  RR: 18 (31 Mar 2023 20:20) (18 - 18)  SpO2: 98% (31 Mar 2023 20:20) (97% - 98%)    O2 Parameters below as of 31 Mar 2023 20:20  Patient On (Oxygen Delivery Method): room air      GEN: NAD  HEENT: normocephalic and atraumatic.   NECK: Supple.   LUNGS: Normal respiratory effort  HEART: Regular rate and rhythm   ABDOMEN: Soft, nontender, and nondistended.    EXTREMITIES: No leg edema. no joint swelling  NEUROLOGIC: AO x 3  PSYCHIATRIC: Appropriate affect .  Labs:  03-31    138  |  108  |  17  ----------------------------<  101<H>  4.5   |  28  |  1.00    Ca    9.9      31 Mar 2023 07:58    TPro  8.6<H>  /  Alb  4.0  /  TBili  0.6  /  DBili  x   /  AST  38<H>  /  ALT  124<H>  /  AlkPhos  119  03-31                          15.3   6.39  )-----------( 237      ( 31 Mar 2023 07:58 )             45.5         LIVER FUNCTIONS - ( 31 Mar 2023 07:58 )  Alb: 4.0 g/dL / Pro: 8.6 g/dL / ALK PHOS: 119 U/L / ALT: 124 U/L / AST: 38 U/L / GGT: x             RECENT CULTURES:  03-28 @ 13:55 .CSF Other, CSF     No growth    No polymorphonuclear cells seen  No organisms seen  by cytocentrifuge      03-28 @ 13:50 .CSF CSF     No growth    No polymorphonuclear leukocytes seen  No organisms seen  by cytocentrifuge      03-27 @ 04:14 Clean Catch Clean Catch (Midstream)     <10,000 CFU/mL Normal Urogenital Judith        03-27 @ 02:49          NotDetec  03-27 @ 02:25 .Blood Blood     No growth to date.        03-27 @ 02:15 .Blood Blood     No growth to date.              All imaging and data are reviewed.

## 2023-03-31 NOTE — PROGRESS NOTE ADULT - SUBJECTIVE AND OBJECTIVE BOX
Patient is a 32y old  Male who presents with a chief complaint of headache, back pain (31 Mar 2023 08:20)      Subjective:  INTERVAL HPI/OVERNIGHT EVENTS: Patient seen and examined at bedside. patient states he feels better last night but the headache started again    MEDICATIONS  (STANDING):  atorvastatin 40 milliGRAM(s) Oral at bedtime  famotidine    Tablet 20 milliGRAM(s) Oral daily  finasteride 5 milliGRAM(s) Oral daily  senna 2 Tablet(s) Oral at bedtime    MEDICATIONS  (PRN):  acetaminophen     Tablet .. 650 milliGRAM(s) Oral every 6 hours PRN Temp greater or equal to 38C (100.4F), Mild Pain (1 - 3)  acetaminophen  300 mG/codeine 30 mG 0.5 Tablet(s) Oral every 6 hours PRN Moderate Pain (4 - 6)  aluminum hydroxide/magnesium hydroxide/simethicone Suspension 30 milliLiter(s) Oral every 4 hours PRN Dyspepsia  bisacodyl 5 milliGRAM(s) Oral every 12 hours PRN Constipation  melatonin 3 milliGRAM(s) Oral at bedtime PRN Insomnia  naproxen 250 milliGRAM(s) Oral every 6 hours PRN Severe Pain (7 - 10)  ondansetron Injectable 4 milliGRAM(s) IV Push every 8 hours PRN Nausea and/or Vomiting  polyethylene glycol 3350 17 Gram(s) Oral daily PRN Constipation  simethicone 80 milliGRAM(s) Chew two times a day PRN Gas      Allergies    No Known Allergies    Intolerances        REVIEW OF SYSTEMS:  CONSTITUTIONAL: No fever or chills  HEENT:  No headache, no sore throat  RESPIRATORY: No cough or shortness of breath  CARDIOVASCULAR: No chest pain or palpitations  GASTROINTESTINAL: No abd pain, nausea, vomiting, or diarrhea      Objective:  Vital Signs Last 24 Hrs  T(C): 36.3 (31 Mar 2023 11:11), Max: 36.8 (30 Mar 2023 13:12)  T(F): 97.4 (31 Mar 2023 11:11), Max: 98.3 (30 Mar 2023 13:12)  HR: 97 (31 Mar 2023 11:11) (57 - 97)  BP: 134/92 (31 Mar 2023 11:11) (121/75 - 143/84)  BP(mean): --  RR: 18 (31 Mar 2023 11:11) (18 - 18)  SpO2: 97% (31 Mar 2023 11:11) (97% - 99%)    Parameters below as of 31 Mar 2023 11:11  Patient On (Oxygen Delivery Method): room air        GENERAL: NAD, lying in bed  HEAD:  Normocephalic  EYES:  conjunctiva and sclera clear  ENT: Moist mucous membranes  NECK: Supple  CHEST/LUNG: Clear to auscultation bilaterally; No rales or rhonchi; no wheezing. Unlabored respirations  HEART: Regular rate and rhythm; S1S2+  ABDOMEN: Bowel sounds present; Soft, mild tender Left side, Nondistended.   EXTREMITIES:  + distal Peripheral Pulses;  No cyanosis, or edema  NERVOUS SYSTEM:  Alert & Oriented X3;  No gross focal deficits   MSK: moves all extremities  SKIN: No rashes     LABS:                        15.3   6.39  )-----------( 237      ( 31 Mar 2023 07:58 )             45.5     31 Mar 2023 07:58    138    |  108    |  17     ----------------------------<  101    4.5     |  28     |  1.00     Ca    9.9        31 Mar 2023 07:58    TPro  8.6    /  Alb  4.0    /  TBili  0.6    /  DBili  x      /  AST  38     /  ALT  124    /  AlkPhos  119    31 Mar 2023 07:58        CAPILLARY BLOOD GLUCOSE            Culture - CSF with Gram Stain (collected 03-28-23 @ 13:55)  Source: .CSF Other, CSF  Gram Stain (03-29-23 @ 12:30):    No polymorphonuclear cells seen    No organisms seen    by cytocentrifuge  Preliminary Report (03-30-23 @ 06:46):    No growth    Culture - Fungal, CSF (collected 03-28-23 @ 13:50)  Source: .CSF CSF  Preliminary Report (03-29-23 @ 06:50):    Testing in progress    Culture - CSF with Gram Stain (collected 03-28-23 @ 13:50)  Source: .CSF CSF  Gram Stain (03-28-23 @ 19:09):    No polymorphonuclear leukocytes seen    No organisms seen    by cytocentrifuge  Preliminary Report (03-29-23 @ 15:13):    No growth    Culture - Acid Fast - CSF (collected 03-28-23 @ 13:50)  Source: .CSF CSF    Babesia microti PCR, Bld (collected 03-28-23 @ 07:37)    Culture - Urine (collected 03-27-23 @ 04:14)  Source: Clean Catch Clean Catch (Midstream)  Final Report (03-28-23 @ 06:31):    <10,000 CFU/mL Normal Urogenital Judith    Culture - Blood (collected 03-27-23 @ 02:25)  Source: .Blood Blood  Preliminary Report (03-28-23 @ 09:03):    No growth to date.    Culture - Blood (collected 03-27-23 @ 02:15)  Source: .Blood Blood  Preliminary Report (03-28-23 @ 09:03):    No growth to date.        Consultant(s) Notes Reviewed:  [x] YES  [ ] NO    Plan of care discussed with patient /family; all questions answered

## 2023-03-31 NOTE — CASE MANAGEMENT PROGRESS NOTE - NSCMPROGRESSNOTE_GEN_ALL_CORE
Discussed patient in interdisciplinary rounds.  Patient +mendoza siddiqi, still pending further testing.  RN reports complaints of headache and back pain yesterday.  No anticipated D/C needs when ready.  WIll remain available from a case management perspective.

## 2023-03-31 NOTE — PROGRESS NOTE ADULT - ASSESSMENT
32y M pmhx renal stones, testicular cancer, presented to the ED c/o headache and low back pain. Patients states he hasn't been "feeling right" for the past few weeks and was considering coming to the hospital. Admitted for sepsis with unclear etiology       Problem/Plan - 1:  ·  Problem: Systemic inflammatory response syndrome (SIRS).   ·  Plan: Patient met SIRS criteria on admission: leukocytosis and tachycardia  - Patient w/ multiple complaints including testicular pain for the past few weeks now progressed to LBP along with HA, weakness, photophobia, cold sweats, chills, abdominal pain   - CT chest: No pulmonary consolidation. right mid lobe small pulmonary nodule   - CT abdomen: No bowel obstruction or inflammation. Distended fluid-filled loops of small bowel in the central abdomen which is nonspecific, but could be seen in the setting of a gastroenteritis. Circumferential urinary bladder wall thickening which could be due to underdistention versus a cystitis. small Left renal stone   - RVP/COVID negative  - MR/CT  brain no acute pathology, MR lumbar spine: Small nonenhancing focus of T2 shortening is seen involving the T11   vertebral body. This finding is nonspecific and could be compatible with a bone island though continued close interval follow-up is recommended to ensure stability and rule out underlying neoplasm.  - testicular ultrasound neg  - HIV/lyme serology negative   - mild elevation of ESR/CRP /RF , cardiolipin Ab negative   -urine/blood cultures negative   -CSF : WBC 0, normal protein and slight elevated CSF TO SERUM GLUCOSE ratio, normal appearance , AFB negative , - gram stain , culture negative  - EBV/CMV panel +  - GC/ chlamydia Pending  - ID consult noted: off antibiotic and antiviral, likely re-activation  EBV   - Neurology consult noted     Problem/Plan - 2:  ·  Problem: Testicular pain.   ·  Plan: - Hx of testicular cancer s/p a multitude of surgical procedures as a child, now in remission   - Patient has hx of stem cell scar revision surgery with thigh fat liposuction in [facility] in 2020 with subsequent symptoms of testicular/back pain   - testicular ultrasound reviewed  - C/w home medication Finasteride.     Problem/Plan - 3:  ·  Problem: HLD (hyperlipidemia).   ·  Plan: C/w Atorvastatin.     Problem/Plan - 4:  ·  Problem: Need for prophylactic measure.   ·  Plan: SCDs    headache and body ache:  change pain management to Naproxen , tylenol #3 prn 32y M pmhx renal stones, testicular cancer, presented to the ED c/o headache and low back pain. Patients states he hasn't been "feeling right" for the past few weeks and was considering coming to the hospital. Admitted for sepsis with unclear etiology       Problem/Plan - 1:  ·  Problem: Systemic inflammatory response syndrome (SIRS).   ·  Plan: Patient met SIRS criteria on admission: leukocytosis and tachycardia  - Patient w/ multiple complaints including testicular pain for the past few weeks now progressed to LBP along with HA, weakness, photophobia, cold sweats, chills, abdominal pain   - CT chest: No pulmonary consolidation. right mid lobe small pulmonary nodule   - CT abdomen: No bowel obstruction or inflammation. Distended fluid-filled loops of small bowel in the central abdomen which is nonspecific, but could be seen in the setting of a gastroenteritis. Circumferential urinary bladder wall thickening which could be due to underdistention versus a cystitis. small Left renal stone   - RVP/COVID negative  - MR/CT  brain no acute pathology, MR lumbar spine: Small nonenhancing focus of T2 shortening is seen involving the T11   vertebral body. This finding is nonspecific and could be compatible with a bone island though continued close interval follow-up is recommended to ensure stability and rule out underlying neoplasm.  - testicular ultrasound neg  - HIV/lyme serology negative   - mild elevation of ESR/CRP /RF , cardiolipin Ab negative   -urine/blood cultures negative   -CSF : WBC 0, normal protein and slight elevated CSF TO SERUM GLUCOSE ratio, normal appearance , AFB negative , - gram stain , culture negative  - EBV/CMV panel +  - GC/ chlamydia Pending  - ID consult noted: off antibiotic and antiviral, likely re-activation  EBV   - Neurology consult noted     Problem/Plan - 2:  ·  Problem: Testicular pain.   ·  Plan: - Hx of testicular cancer s/p a multitude of surgical procedures as a child, now in remission   - Patient has hx of stem cell scar revision surgery with thigh fat liposuction in [facility] in 2020 with subsequent symptoms of testicular/back pain   - testicular ultrasound reviewed  - C/w home medication Finasteride.     Problem/Plan - 3:  ·  Problem: HLD (hyperlipidemia).   ·  Plan: C/w Atorvastatin.     Problem/Plan - 4:  ·  Problem: Need for prophylactic measure.   ·  Plan: SCDs    headache and body ache:  change pain management to Naproxen , tylenol #3 prn    elevated LFT: improved, could be related to viral infection

## 2023-03-31 NOTE — PROGRESS NOTE ADULT - ASSESSMENT
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 Unclear etiology, infectious workup so far unrevealing. MRI head and lumbar spine showed no evidence of infection. Lumbar puncture done and findings not consistent with meningitis/encephalitis (CSF cell count 1, glucose 77, protein 33). CSF HSV and PCR panel are negative.    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 Both CMV IgM/IgG also elevated. Transaminitis improving. He remains afebrile and without leukocytosis. HIV and syphilis screen negative. Urine chlamydia/GC negative. Lyme and babesia negative. EMMIE/RF unevealing. Blood cultures remain no growth. CT chest and A/P showed no lymphadenopathy or signs of infection. US without evidence of testicular torsion.    -follow up CSF fungal/AFB cultures, VDRL  -follow up tick serologies  -can follow up with ID as outpatient    Maryana Rodgers MD  Division of Infectious Diseases   Cell [phone] between 8am and 6pm   After 6pm and weekends please call ID service at [phone].      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 Unclear etiology, infectious workup so far unrevealing. MRI head and lumbar spine showed no evidence of infection. Lumbar puncture done and findings not consistent with meningitis/encephalitis (CSF cell count 1, glucose 77, protein 33). CSF HSV and PCR panel are negative.    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 Both CMV IgM/IgG also elevated. Transaminitis improving. He remains afebrile and without leukocytosis. HIV and syphilis screen negative. Urine chlamydia/GC negative. Initial Lyme and babesia negative. Repeat Lyme serology nondiagnostic, reflex immunoblot testing done. EMMIE/RF unevealing. Blood cultures remain no growth. CT chest and A/P showed no lymphadenopathy or signs of infection. US without evidence of testicular torsion.    -follow up CSF fungal/AFB cultures, VDRL  -can follow up with ID as outpatient  -I will be covered by Dr. Trang Szymanski this weekend, 4/1-4/2/23    Maryana Rodgers MD  Division of Infectious Diseases   Cell [phone] between 8am and 6pm   After 6pm and weekends please call ID service at [phone].      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 Unclear etiology, infectious workup so far unrevealing. MRI head and lumbar spine showed no evidence of infection. Lumbar puncture done and findings not consistent with meningitis/encephalitis (CSF cell count 1, glucose 77, protein 33). CSF HSV and PCR panel are negative.    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 Both CMV IgM/IgG also elevated. Transaminitis improving. He remains afebrile and without leukocytosis. HIV and syphilis screen negative. Urine chlamydia/GC negative. Initial Lyme and babesia negative. Repeat Lyme serology nondiagnostic, although reflex immunoblot testing with negative IgM/IgG. EMMIE/RF unevealing. Blood cultures remain no growth. CT chest and A/P showed no lymphadenopathy or signs of infection. US without evidence of testicular torsion.    -follow up CSF fungal/AFB cultures, VDRL  -can follow up with ID as outpatient  -I will be covered by Dr. Trang Szymanski this weekend, 4/1-4/2/23    Maryana Rodgers MD  Division of Infectious Diseases   Cell [phone] between 8am and 6pm   After 6pm and weekends please call ID service at [phone].

## 2023-04-01 ENCOUNTER — TRANSCRIPTION ENCOUNTER (OUTPATIENT)
Age: 33
End: 2023-04-01

## 2023-04-01 VITALS
TEMPERATURE: 98 F | OXYGEN SATURATION: 98 % | HEART RATE: 84 BPM | RESPIRATION RATE: 20 BRPM | SYSTOLIC BLOOD PRESSURE: 120 MMHG

## 2023-04-01 LAB
CULTURE RESULTS: SIGNIFICANT CHANGE UP
CULTURE RESULTS: SIGNIFICANT CHANGE UP
SPECIMEN SOURCE: SIGNIFICANT CHANGE UP
SPECIMEN SOURCE: SIGNIFICANT CHANGE UP
VDRL CSF-TITR: SIGNIFICANT CHANGE UP

## 2023-04-01 PROCEDURE — 99232 SBSQ HOSP IP/OBS MODERATE 35: CPT

## 2023-04-01 PROCEDURE — 86645 CMV ANTIBODY IGM: CPT

## 2023-04-01 PROCEDURE — 96365 THER/PROPH/DIAG IV INF INIT: CPT

## 2023-04-01 PROCEDURE — 99239 HOSP IP/OBS DSCHRG MGMT >30: CPT

## 2023-04-01 PROCEDURE — 89051 BODY FLUID CELL COUNT: CPT

## 2023-04-01 PROCEDURE — 87529 HSV DNA AMP PROBE: CPT

## 2023-04-01 PROCEDURE — 86644 CMV ANTIBODY: CPT

## 2023-04-01 PROCEDURE — 85025 COMPLETE CBC W/AUTO DIFF WBC: CPT

## 2023-04-01 PROCEDURE — 80053 COMPREHEN METABOLIC PANEL: CPT

## 2023-04-01 PROCEDURE — 86038 ANTINUCLEAR ANTIBODIES: CPT

## 2023-04-01 PROCEDURE — 72158 MRI LUMBAR SPINE W/O & W/DYE: CPT

## 2023-04-01 PROCEDURE — 86592 SYPHILIS TEST NON-TREP QUAL: CPT

## 2023-04-01 PROCEDURE — 81001 URINALYSIS AUTO W/SCOPE: CPT

## 2023-04-01 PROCEDURE — 86147 CARDIOLIPIN ANTIBODY EA IG: CPT

## 2023-04-01 PROCEDURE — 87591 N.GONORRHOEAE DNA AMP PROB: CPT

## 2023-04-01 PROCEDURE — 83605 ASSAY OF LACTIC ACID: CPT

## 2023-04-01 PROCEDURE — 74177 CT ABD & PELVIS W/CONTRAST: CPT | Mod: MA

## 2023-04-01 PROCEDURE — 70450 CT HEAD/BRAIN W/O DYE: CPT | Mod: MA

## 2023-04-01 PROCEDURE — 83615 LACTATE (LD) (LDH) ENZYME: CPT

## 2023-04-01 PROCEDURE — 80048 BASIC METABOLIC PNL TOTAL CA: CPT

## 2023-04-01 PROCEDURE — 86036 ANCA SCREEN EACH ANTIBODY: CPT

## 2023-04-01 PROCEDURE — 36415 COLL VENOUS BLD VENIPUNCTURE: CPT

## 2023-04-01 PROCEDURE — 70551 MRI BRAIN STEM W/O DYE: CPT

## 2023-04-01 PROCEDURE — 87389 HIV-1 AG W/HIV-1&-2 AB AG IA: CPT

## 2023-04-01 PROCEDURE — 87070 CULTURE OTHR SPECIMN AEROBIC: CPT

## 2023-04-01 PROCEDURE — A9579: CPT

## 2023-04-01 PROCEDURE — 86789 WEST NILE VIRUS ANTIBODY: CPT

## 2023-04-01 PROCEDURE — 86780 TREPONEMA PALLIDUM: CPT

## 2023-04-01 PROCEDURE — 0225U NFCT DS DNA&RNA 21 SARSCOV2: CPT

## 2023-04-01 PROCEDURE — 72100 X-RAY EXAM L-S SPINE 2/3 VWS: CPT

## 2023-04-01 PROCEDURE — 99285 EMERGENCY DEPT VISIT HI MDM: CPT | Mod: 25

## 2023-04-01 PROCEDURE — 87040 BLOOD CULTURE FOR BACTERIA: CPT

## 2023-04-01 PROCEDURE — 87491 CHLMYD TRACH DNA AMP PROBE: CPT

## 2023-04-01 PROCEDURE — 86403 PARTICLE AGGLUT ANTBDY SCRN: CPT

## 2023-04-01 PROCEDURE — 87086 URINE CULTURE/COLONY COUNT: CPT

## 2023-04-01 PROCEDURE — 87205 SMEAR GRAM STAIN: CPT

## 2023-04-01 PROCEDURE — 86664 EPSTEIN-BARR NUCLEAR ANTIGEN: CPT

## 2023-04-01 PROCEDURE — 93975 VASCULAR STUDY: CPT

## 2023-04-01 PROCEDURE — 76870 US EXAM SCROTUM: CPT

## 2023-04-01 PROCEDURE — 84443 ASSAY THYROID STIM HORMONE: CPT

## 2023-04-01 PROCEDURE — 86666 EHRLICHIA ANTIBODY: CPT

## 2023-04-01 PROCEDURE — 86663 EPSTEIN-BARR ANTIBODY: CPT

## 2023-04-01 PROCEDURE — 87116 MYCOBACTERIA CULTURE: CPT

## 2023-04-01 PROCEDURE — 82945 GLUCOSE OTHER FLUID: CPT

## 2023-04-01 PROCEDURE — 86618 LYME DISEASE ANTIBODY: CPT

## 2023-04-01 PROCEDURE — 87798 DETECT AGENT NOS DNA AMP: CPT

## 2023-04-01 PROCEDURE — 71045 X-RAY EXAM CHEST 1 VIEW: CPT

## 2023-04-01 PROCEDURE — 96375 TX/PRO/DX INJ NEW DRUG ADDON: CPT

## 2023-04-01 PROCEDURE — 85027 COMPLETE CBC AUTOMATED: CPT

## 2023-04-01 PROCEDURE — 87483 CNS DNA AMP PROBE TYPE 12-25: CPT

## 2023-04-01 PROCEDURE — 86788 WEST NILE VIRUS AB IGM: CPT

## 2023-04-01 PROCEDURE — 86140 C-REACTIVE PROTEIN: CPT

## 2023-04-01 PROCEDURE — 86753 PROTOZOA ANTIBODY NOS: CPT

## 2023-04-01 PROCEDURE — 71260 CT THORAX DX C+: CPT | Mod: MA

## 2023-04-01 PROCEDURE — 85610 PROTHROMBIN TIME: CPT

## 2023-04-01 PROCEDURE — 85730 THROMBOPLASTIN TIME PARTIAL: CPT

## 2023-04-01 PROCEDURE — 86665 EPSTEIN-BARR CAPSID VCA: CPT

## 2023-04-01 PROCEDURE — 85652 RBC SED RATE AUTOMATED: CPT

## 2023-04-01 PROCEDURE — 87102 FUNGUS ISOLATION CULTURE: CPT

## 2023-04-01 PROCEDURE — 86431 RHEUMATOID FACTOR QUANT: CPT

## 2023-04-01 PROCEDURE — 84157 ASSAY OF PROTEIN OTHER: CPT

## 2023-04-01 RX ORDER — ACETAMINOPHEN WITH CODEINE 300MG-30MG
0.5 TABLET ORAL
Qty: 14 | Refills: 0
Start: 2023-04-01 | End: 2023-04-07

## 2023-04-01 RX ORDER — ACETAMINOPHEN 500 MG
1 TABLET ORAL
Qty: 30 | Refills: 0
Start: 2023-04-01 | End: 2023-04-07

## 2023-04-01 RX ADMIN — FINASTERIDE 5 MILLIGRAM(S): 5 TABLET, FILM COATED ORAL at 12:41

## 2023-04-01 RX ADMIN — Medication 0.5 TABLET(S): at 12:47

## 2023-04-01 RX ADMIN — Medication 0.5 TABLET(S): at 13:00

## 2023-04-01 RX ADMIN — FAMOTIDINE 20 MILLIGRAM(S): 10 INJECTION INTRAVENOUS at 12:40

## 2023-04-01 NOTE — PROGRESS NOTE ADULT - REASON FOR ADMISSION
headache, back pain

## 2023-04-01 NOTE — DISCHARGE NOTE PROVIDER - CARE PROVIDER_API CALL
Román Tellez ()  Family Medicine  [address] [place] [address]  Phone: [phone]  Fax: [phone]  Follow Up Time: 1-3 days    Maryana Rodgers)  Infectious Disease; Internal Medicine  [address] [place] [address]  Phone: [phone]  Fax: [phone]  Follow Up Time: 2 weeks

## 2023-04-01 NOTE — CASE MANAGEMENT PROGRESS NOTE - NSCMPROGRESSNOTE_GEN_ALL_CORE
Anticipated for DC home today. CM met with patient at bedside, introduced self and explained role of CM. Patient verbalized understanding. No skilled needs identified at present, plan to follow up outpatient for further management. Patient states when DC confirmed, he will either arrange an UBER or have a friend transport him home. He is in agreement with DC plan. He requested medical records of his hospital stay, CM provided written information to him on how to access the patient portal. He verbalized understanding. CM remains available.

## 2023-04-01 NOTE — DISCHARGE NOTE NURSING/CASE MANAGEMENT/SOCIAL WORK - NSDCPEFALRISK_GEN_ALL_CORE
For information on Fall & Injury Prevention, visit: [email or web address] OR  [email or web address] OR  https://www.cdc.gov/steadi/patient.html

## 2023-04-01 NOTE — DISCHARGE NOTE NURSING/CASE MANAGEMENT/SOCIAL WORK - PATIENT PORTAL LINK FT
You can access the FollowMyHealth Patient Portal offered by [facility] by registering at the following website: [email or web address]. By joining [employer]’s FollowMyHealth portal, you will also be able to view your health information using other applications (apps) compatible with our system.

## 2023-04-01 NOTE — PROGRESS NOTE ADULT - SUBJECTIVE AND OBJECTIVE BOX
Neurology follow up note    LUCERO SEGURA32yMale      Interval History:    Patient feels on and off pain   Allergies    No Known Allergies    Intolerances        MEDICATIONS    acetaminophen     Tablet .. 650 milliGRAM(s) Oral every 6 hours PRN  acetaminophen  300 mG/codeine 30 mG 0.5 Tablet(s) Oral every 6 hours PRN  aluminum hydroxide/magnesium hydroxide/simethicone Suspension 30 milliLiter(s) Oral every 4 hours PRN  atorvastatin 40 milliGRAM(s) Oral at bedtime  bisacodyl 5 milliGRAM(s) Oral every 12 hours PRN  famotidine    Tablet 20 milliGRAM(s) Oral daily  finasteride 5 milliGRAM(s) Oral daily  melatonin 3 milliGRAM(s) Oral at bedtime PRN  naproxen 250 milliGRAM(s) Oral every 6 hours PRN  ondansetron Injectable 4 milliGRAM(s) IV Push every 8 hours PRN  polyethylene glycol 3350 17 Gram(s) Oral daily PRN  senna 2 Tablet(s) Oral at bedtime  simethicone 80 milliGRAM(s) Chew two times a day PRN              Vital Signs Last 24 Hrs  T(C): 36.4 (01 Apr 2023 04:56), Max: 36.7 (31 Mar 2023 20:20)  T(F): 97.6 (01 Apr 2023 04:56), Max: 98.1 (31 Mar 2023 20:20)  HR: 59 (01 Apr 2023 04:56) (59 - 97)  BP: 95/68 (01 Apr 2023 04:56) (95/68 - 134/92)  BP(mean): --  RR: 18 (01 Apr 2023 04:56) (18 - 18)  SpO2: 98% (01 Apr 2023 04:56) (97% - 98%)    Parameters below as of 01 Apr 2023 04:56  Patient On (Oxygen Delivery Method): room air        REVIEW OF SYSTEMS:  Constitutional:  Positive history of feeling fevers Positive history of generalized pain throughout his body and his head.  Eyes:  No double vision or blurry vision.  Ears:  No ringing in the ears.  Neck:  Positive history of neck pain for last few days.  Respiratory:  No shortness of breath.  Cardiovascular:  No chest pain.  Abdomen:  Positive episodes of abdominal pain.  Extremities/Neurological:  No numbness or tingling.  Musculoskeletal:  Positive history of joint pain diffusely throughout his body.  General:  Positive history of back pain.  PHYSICAL EXAMINATION:   HEENT:  Head:  Normocephalic, atraumatic.  Eyes:  No scleral icterus.  Ears:  Hearing bilaterally intact.  NECK:  Supple.  RESPIRATORY:  Good air entry bilaterally.  CARDIOVASCULAR:  S1 and S2 heard.  ABDOMEN:  Soft and nontender.  EXTREMITIES:  No clubbing or cyanosis was noted.      NEUROLOGIC:  The patient is awake, alert, and oriented x3.  Extraocular movements were intact.  Speech was fluent.  Smile symmetric.  Motor:  Bilateral upper and lower 5/5.  Sensory:  Bilateral upper and lower intact to light touch.  No drift.  General:  Upon palpating his lower back, he complains of severe pain.              LABS:  CBC Full  -  ( 31 Mar 2023 07:58 )  WBC Count : 6.39 K/uL  RBC Count : 5.31 M/uL  Hemoglobin : 15.3 g/dL  Hematocrit : 45.5 %  Platelet Count - Automated : 237 K/uL  Mean Cell Volume : 85.7 fl  Mean Cell Hemoglobin : 28.8 pg  Mean Cell Hemoglobin Concentration : 33.6 gm/dL  Auto Neutrophil # : 4.35 K/uL  Auto Lymphocyte # : 1.34 K/uL  Auto Monocyte # : 0.58 K/uL  Auto Eosinophil # : 0.06 K/uL  Auto Basophil # : 0.06 K/uL  Auto Neutrophil % : 68.0 %  Auto Lymphocyte % : 21.0 %  Auto Monocyte % : 9.0 %  Auto Eosinophil % : 1.0 %  Auto Basophil % : 1.0 %      03-31    138  |  108  |  17  ----------------------------<  101<H>  4.5   |  28  |  1.00    Ca    9.9      31 Mar 2023 07:58    TPro  8.6<H>  /  Alb  4.0  /  TBili  0.6  /  DBili  x   /  AST  38<H>  /  ALT  124<H>  /  AlkPhos  119  03-31    Hemoglobin A1C:     LIVER FUNCTIONS - ( 31 Mar 2023 07:58 )  Alb: 4.0 g/dL / Pro: 8.6 g/dL / ALK PHOS: 119 U/L / ALT: 124 U/L / AST: 38 U/L / GGT: x           Vitamin B12         RADIOLOGY      ANALYSIS AND PLAN:  This is a 32-year-old with a history of back pain, testicular pain, generalized body pain, and headaches with fevers.  For episode of generalized body aches and headaches, suspect most likely secondary to fever, at present unclear etiology.  The patient did have a history of body pain in the past for which he received stem cell transplant, unclear what type of phenomenon he had at that time. MRI imaging of lumbar spine negative .  The patient is currently off antibiotics, questionable this could be any type of viral syndrome the patient could be exhibiting and any type of fevers can cause headaches with generalized body pain.   Antibiotics as needed.  Infectious Disease followup as needed   CSF WBC 0  vasculitis work up underway   EBV infection   neurologic wise stable       Greater than 30 minutes of time was spent with the patient, plan of care, reviewing data, with greater than 50% of the visit was spent counseling and/or coordinating care with multidisciplinary healthcare team       Neurology follow up note    LUCERO SEGURA32yMale      Interval History:    Patient feels on and off pain     Allergies    No Known Allergies    Intolerances        MEDICATIONS    acetaminophen     Tablet .. 650 milliGRAM(s) Oral every 6 hours PRN  acetaminophen  300 mG/codeine 30 mG 0.5 Tablet(s) Oral every 6 hours PRN  aluminum hydroxide/magnesium hydroxide/simethicone Suspension 30 milliLiter(s) Oral every 4 hours PRN  atorvastatin 40 milliGRAM(s) Oral at bedtime  bisacodyl 5 milliGRAM(s) Oral every 12 hours PRN  famotidine    Tablet 20 milliGRAM(s) Oral daily  finasteride 5 milliGRAM(s) Oral daily  melatonin 3 milliGRAM(s) Oral at bedtime PRN  naproxen 250 milliGRAM(s) Oral every 6 hours PRN  ondansetron Injectable 4 milliGRAM(s) IV Push every 8 hours PRN  polyethylene glycol 3350 17 Gram(s) Oral daily PRN  senna 2 Tablet(s) Oral at bedtime  simethicone 80 milliGRAM(s) Chew two times a day PRN              Vital Signs Last 24 Hrs  T(C): 36.4 (01 Apr 2023 04:56), Max: 36.7 (31 Mar 2023 20:20)  T(F): 97.6 (01 Apr 2023 04:56), Max: 98.1 (31 Mar 2023 20:20)  HR: 59 (01 Apr 2023 04:56) (59 - 97)  BP: 95/68 (01 Apr 2023 04:56) (95/68 - 134/92)  BP(mean): --  RR: 18 (01 Apr 2023 04:56) (18 - 18)  SpO2: 98% (01 Apr 2023 04:56) (97% - 98%)    Parameters below as of 01 Apr 2023 04:56  Patient On (Oxygen Delivery Method): room air        REVIEW OF SYSTEMS:  Constitutional:  Positive history of feeling fevers Positive history of generalized pain throughout his body and his head.  Eyes:  No double vision or blurry vision.  Ears:  No ringing in the ears.  Neck:  Positive history of neck pain for last few days.  Respiratory:  No shortness of breath.  Cardiovascular:  No chest pain.  Abdomen:  Positive episodes of abdominal pain.  Extremities/Neurological:  No numbness or tingling.  Musculoskeletal:  Positive history of joint pain diffusely throughout his body.  General:  Positive history of back pain.  PHYSICAL EXAMINATION:   HEENT:  Head:  Normocephalic, atraumatic.  Eyes:  No scleral icterus.  Ears:  Hearing bilaterally intact.  NECK:  Supple.  RESPIRATORY:  Good air entry bilaterally.  CARDIOVASCULAR:  S1 and S2 heard.  ABDOMEN:  Soft and nontender.  EXTREMITIES:  No clubbing or cyanosis was noted.      NEUROLOGIC:  The patient is awake, alert, and oriented x3.  Extraocular movements were intact.  Speech was fluent.  Smile symmetric.  Motor:  Bilateral upper and lower 5/5.  Sensory:  Bilateral upper and lower intact to light touch.  No drift.  General:  Upon palpating his lower back, he complains of severe pain.              LABS:  CBC Full  -  ( 31 Mar 2023 07:58 )  WBC Count : 6.39 K/uL  RBC Count : 5.31 M/uL  Hemoglobin : 15.3 g/dL  Hematocrit : 45.5 %  Platelet Count - Automated : 237 K/uL  Mean Cell Volume : 85.7 fl  Mean Cell Hemoglobin : 28.8 pg  Mean Cell Hemoglobin Concentration : 33.6 gm/dL  Auto Neutrophil # : 4.35 K/uL  Auto Lymphocyte # : 1.34 K/uL  Auto Monocyte # : 0.58 K/uL  Auto Eosinophil # : 0.06 K/uL  Auto Basophil # : 0.06 K/uL  Auto Neutrophil % : 68.0 %  Auto Lymphocyte % : 21.0 %  Auto Monocyte % : 9.0 %  Auto Eosinophil % : 1.0 %  Auto Basophil % : 1.0 %      03-31    138  |  108  |  17  ----------------------------<  101<H>  4.5   |  28  |  1.00    Ca    9.9      31 Mar 2023 07:58    TPro  8.6<H>  /  Alb  4.0  /  TBili  0.6  /  DBili  x   /  AST  38<H>  /  ALT  124<H>  /  AlkPhos  119  03-31    Hemoglobin A1C:     LIVER FUNCTIONS - ( 31 Mar 2023 07:58 )  Alb: 4.0 g/dL / Pro: 8.6 g/dL / ALK PHOS: 119 U/L / ALT: 124 U/L / AST: 38 U/L / GGT: x           Vitamin B12         RADIOLOGY      ANALYSIS AND PLAN:  This is a 32-year-old with a history of back pain, testicular pain, generalized body pain, and headaches with fevers.  For episode of generalized body aches and headaches, suspect most likely secondary to fever, at present unclear etiology.  The patient did have a history of body pain in the past for which he received stem cell transplant, unclear what type of phenomenon he had at that time. MRI imaging of lumbar spine negative .  The patient is currently off antibiotics, questionable this could be any type of viral syndrome the patient could be exhibiting and any type of fevers can cause headaches with generalized body pain.   Antibiotics as needed.  Infectious Disease followup as needed   CSF WBC 0  vasculitis work up underway   EBV infection   neurologic wise stable       Greater than 30 minutes of time was spent with the patient, plan of care, reviewing data, with greater than 50% of the visit was spent counseling and/or coordinating care with multidisciplinary healthcare team

## 2023-04-01 NOTE — DISCHARGE NOTE NURSING/CASE MANAGEMENT/SOCIAL WORK - NSFLUVACAGEDISCH_IMM_ALL_CORE
Adult Oculoplastic Surgeon Procedure Text (D): After obtaining clear surgical margins the patient was sent to oculoplastics for surgical repair.  The patient understands they will receive post-surgical care and follow-up from the referring physician's office.

## 2023-04-01 NOTE — DISCHARGE NOTE PROVIDER - NSDCCPCAREPLAN_GEN_ALL_CORE_FT
PRINCIPAL DISCHARGE DIAGNOSIS  Diagnosis: EBV infection  Assessment and Plan of Treatment: Supportive care, follow up Infectious disease as instructed      SECONDARY DISCHARGE DIAGNOSES  Diagnosis: Headache  Assessment and Plan of Treatment: pain management as current , seek medical care if symptom worse    Diagnosis: Back pain  Assessment and Plan of Treatment: painmedication as discussed

## 2023-04-01 NOTE — DISCHARGE NOTE PROVIDER - NSDCMRMEDTOKEN_GEN_ALL_CORE_FT
acetaminophen 325 mg oral tablet: 1 tab(s) orally every 6 hours as needed for Temp greater or equal to 38C (100.4F), Mild Pain (1 - 3) as needed for pain  codeine-acetaminophen 30 mg-300 mg oral tablet: 0.5 tab(s) orally every 6 hours as needed for Moderate Pain (4 - 6) as needed for pain MDD: 60mg  famotidine 20 mg oral tablet: 1 orally once a day  finasteride 1 mg oral tablet: 1 orally once a day  rosuvastatin 10 mg oral capsule: 1 orally once a day  testosterone 20.25 mg/actuation (1.62%) transdermal gel: Apply topically to affected area  Vyvanse 30 mg oral capsule: 1 orally once a day   acetaminophen 325 mg oral tablet: 1 tab(s) orally every 6 hours as needed for Temp greater or equal to 38C (100.4F), Mild Pain (1 - 3) as needed for pain  codeine-acetaminophen 30 mg-300 mg oral tablet: 0.5 tab(s) orally every 6 hours as needed for Moderate Pain (4 - 6) as needed for pain MDD: 60mg  famotidine 20 mg oral tablet: 1 orally once a day  finasteride 1 mg oral tablet: 1 orally once a day  rosuvastatin 10 mg oral capsule: 1 orally once a day  testosterone 20.25 mg/actuation (1.62%) transdermal gel: Apply topically to affected area once a day MDD: 20.25mg  Vyvanse 30 mg oral capsule: 1 orally once a day

## 2023-04-01 NOTE — DISCHARGE NOTE PROVIDER - PROVIDER TOKENS
PROVIDER:[TOKEN:[[id number]],FOLLOWUP:[1-3 days]],PROVIDER:[TOKEN:[[id number]],FOLLOWUP:[2 weeks]]

## 2023-04-01 NOTE — PROGRESS NOTE ADULT - PROVIDER SPECIALTY LIST ADULT
Hospitalist
Infectious Disease
Infectious Disease
Neurology
Infectious Disease
Neurology
Infectious Disease
Hospitalist

## 2023-04-01 NOTE — DISCHARGE NOTE PROVIDER - HOSPITAL COURSE
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 Unclear etiology, infectious workup so far unrevealing. MRI head and lumbar spine showed no evidence of infection. Lumbar puncture done and findings not consistent with meningitis/encephalitis. CSF HSV and PCR panel are negative.  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 Both CMV IgM/IgG also elevated. Transaminitis improving. He is now afebrile and without leukocytosis. HIV and syphilis screen negative. Urine chlamydia/GC negative. Initial Lyme and babesia negative. Repeat Lyme serology nondiagnostic, although reflex immunoblot testing with negative IgM/IgG. EMMIE/RF unevealing. Blood cultures remain no growth. CT chest and A/P showed no lymphadenopathy or signs of infection. US without evidence of testicular torsion.  ID consulted: was initially on IV antibiotic and antiviral medication , which subsequently discontinued after culture results obtained. condition improved while off antibiotic .   -can follow up with ID as outpatient  neurology was consulted->symptom likely related to infectious etiology.  pain management for headache and body aches. , condition stable for discharge

## 2023-04-01 NOTE — DISCHARGE NOTE PROVIDER - ATTENDING DISCHARGE PHYSICAL EXAMINATION:
GENERAL: NAD, lying in bed  HEAD:  Normocephalic  EYES:  conjunctiva and sclera clear  ENT: Moist mucous membranes  NECK: Supple  CHEST/LUNG: Clear to auscultation bilaterally; No rales or rhonchi; no wheezing. Unlabored respirations  HEART: Regular rate and rhythm; S1S2+  ABDOMEN: Bowel sounds present; Soft, mild tender Left side, Nondistended.   EXTREMITIES:  + distal Peripheral Pulses;  No cyanosis, or edema  NERVOUS SYSTEM:  Alert & Oriented X3;  No gross focal deficits   MSK: moves all extremities  SKIN: No rashes

## 2023-04-02 LAB — ANA TITR SER: NEGATIVE — SIGNIFICANT CHANGE UP

## 2023-04-26 LAB
CULTURE RESULTS: SIGNIFICANT CHANGE UP
SPECIMEN SOURCE: SIGNIFICANT CHANGE UP

## 2023-05-13 LAB
CULTURE RESULTS: SIGNIFICANT CHANGE UP
SPECIMEN SOURCE: SIGNIFICANT CHANGE UP

## 2023-10-25 ENCOUNTER — EMERGENCY (EMERGENCY)
Facility: HOSPITAL | Age: 33
LOS: 1 days | Discharge: HOME | End: 2023-10-25
Attending: EMERGENCY MEDICINE | Admitting: EMERGENCY MEDICINE
Payer: SELF-PAY

## 2023-10-25 VITALS
DIASTOLIC BLOOD PRESSURE: 81 MMHG | RESPIRATION RATE: 18 BRPM | WEIGHT: 145.06 LBS | OXYGEN SATURATION: 99 % | SYSTOLIC BLOOD PRESSURE: 129 MMHG | TEMPERATURE: 98 F | HEIGHT: 68 IN | HEART RATE: 85 BPM

## 2023-10-25 DIAGNOSIS — C62.90 MALIGNANT NEOPLASM OF UNSPECIFIED TESTIS, UNSPECIFIED WHETHER DESCENDED OR UNDESCENDED: Chronic | ICD-10-CM

## 2023-10-25 LAB
ALBUMIN SERPL ELPH-MCNC: 4.3 G/DL — SIGNIFICANT CHANGE UP (ref 3.3–5)
ALBUMIN SERPL ELPH-MCNC: 4.3 G/DL — SIGNIFICANT CHANGE UP (ref 3.3–5)
ALP SERPL-CCNC: 59 U/L — SIGNIFICANT CHANGE UP (ref 40–120)
ALP SERPL-CCNC: 59 U/L — SIGNIFICANT CHANGE UP (ref 40–120)
ALT FLD-CCNC: 31 U/L — SIGNIFICANT CHANGE UP (ref 12–78)
ALT FLD-CCNC: 31 U/L — SIGNIFICANT CHANGE UP (ref 12–78)
ANION GAP SERPL CALC-SCNC: 4 MMOL/L — LOW (ref 5–17)
ANION GAP SERPL CALC-SCNC: 4 MMOL/L — LOW (ref 5–17)
APPEARANCE UR: CLEAR — SIGNIFICANT CHANGE UP
APPEARANCE UR: CLEAR — SIGNIFICANT CHANGE UP
APTT BLD: 39.4 SEC — HIGH (ref 24.5–35.6)
APTT BLD: 39.4 SEC — HIGH (ref 24.5–35.6)
AST SERPL-CCNC: 18 U/L — SIGNIFICANT CHANGE UP (ref 15–37)
AST SERPL-CCNC: 18 U/L — SIGNIFICANT CHANGE UP (ref 15–37)
BASOPHILS # BLD AUTO: 0.02 K/UL — SIGNIFICANT CHANGE UP (ref 0–0.2)
BASOPHILS # BLD AUTO: 0.02 K/UL — SIGNIFICANT CHANGE UP (ref 0–0.2)
BASOPHILS NFR BLD AUTO: 0.3 % — SIGNIFICANT CHANGE UP (ref 0–2)
BASOPHILS NFR BLD AUTO: 0.3 % — SIGNIFICANT CHANGE UP (ref 0–2)
BILIRUB SERPL-MCNC: 0.5 MG/DL — SIGNIFICANT CHANGE UP (ref 0.2–1.2)
BILIRUB SERPL-MCNC: 0.5 MG/DL — SIGNIFICANT CHANGE UP (ref 0.2–1.2)
BILIRUB UR-MCNC: NEGATIVE — SIGNIFICANT CHANGE UP
BILIRUB UR-MCNC: NEGATIVE — SIGNIFICANT CHANGE UP
BUN SERPL-MCNC: 13 MG/DL — SIGNIFICANT CHANGE UP (ref 7–23)
BUN SERPL-MCNC: 13 MG/DL — SIGNIFICANT CHANGE UP (ref 7–23)
CALCIUM SERPL-MCNC: 9.4 MG/DL — SIGNIFICANT CHANGE UP (ref 8.5–10.1)
CALCIUM SERPL-MCNC: 9.4 MG/DL — SIGNIFICANT CHANGE UP (ref 8.5–10.1)
CHLORIDE SERPL-SCNC: 109 MMOL/L — HIGH (ref 96–108)
CHLORIDE SERPL-SCNC: 109 MMOL/L — HIGH (ref 96–108)
CO2 SERPL-SCNC: 29 MMOL/L — SIGNIFICANT CHANGE UP (ref 22–31)
CO2 SERPL-SCNC: 29 MMOL/L — SIGNIFICANT CHANGE UP (ref 22–31)
COLOR SPEC: YELLOW — SIGNIFICANT CHANGE UP
COLOR SPEC: YELLOW — SIGNIFICANT CHANGE UP
CREAT SERPL-MCNC: 1.2 MG/DL — SIGNIFICANT CHANGE UP (ref 0.5–1.3)
CREAT SERPL-MCNC: 1.2 MG/DL — SIGNIFICANT CHANGE UP (ref 0.5–1.3)
DIFF PNL FLD: ABNORMAL
DIFF PNL FLD: ABNORMAL
EGFR: 81 ML/MIN/1.73M2 — SIGNIFICANT CHANGE UP
EGFR: 81 ML/MIN/1.73M2 — SIGNIFICANT CHANGE UP
EOSINOPHIL # BLD AUTO: 0.09 K/UL — SIGNIFICANT CHANGE UP (ref 0–0.5)
EOSINOPHIL # BLD AUTO: 0.09 K/UL — SIGNIFICANT CHANGE UP (ref 0–0.5)
EOSINOPHIL NFR BLD AUTO: 1.2 % — SIGNIFICANT CHANGE UP (ref 0–6)
EOSINOPHIL NFR BLD AUTO: 1.2 % — SIGNIFICANT CHANGE UP (ref 0–6)
GLUCOSE SERPL-MCNC: 98 MG/DL — SIGNIFICANT CHANGE UP (ref 70–99)
GLUCOSE SERPL-MCNC: 98 MG/DL — SIGNIFICANT CHANGE UP (ref 70–99)
GLUCOSE UR QL: NEGATIVE MG/DL — SIGNIFICANT CHANGE UP
GLUCOSE UR QL: NEGATIVE MG/DL — SIGNIFICANT CHANGE UP
HCT VFR BLD CALC: 43 % — SIGNIFICANT CHANGE UP (ref 39–50)
HCT VFR BLD CALC: 43 % — SIGNIFICANT CHANGE UP (ref 39–50)
HGB BLD-MCNC: 14.7 G/DL — SIGNIFICANT CHANGE UP (ref 13–17)
HGB BLD-MCNC: 14.7 G/DL — SIGNIFICANT CHANGE UP (ref 13–17)
IMM GRANULOCYTES NFR BLD AUTO: 0.1 % — SIGNIFICANT CHANGE UP (ref 0–0.9)
IMM GRANULOCYTES NFR BLD AUTO: 0.1 % — SIGNIFICANT CHANGE UP (ref 0–0.9)
INR BLD: 0.96 RATIO — SIGNIFICANT CHANGE UP (ref 0.85–1.18)
INR BLD: 0.96 RATIO — SIGNIFICANT CHANGE UP (ref 0.85–1.18)
KETONES UR-MCNC: ABNORMAL MG/DL
KETONES UR-MCNC: ABNORMAL MG/DL
LEUKOCYTE ESTERASE UR-ACNC: NEGATIVE — SIGNIFICANT CHANGE UP
LEUKOCYTE ESTERASE UR-ACNC: NEGATIVE — SIGNIFICANT CHANGE UP
LIDOCAIN IGE QN: 34 U/L — SIGNIFICANT CHANGE UP (ref 13–75)
LIDOCAIN IGE QN: 34 U/L — SIGNIFICANT CHANGE UP (ref 13–75)
LYMPHOCYTES # BLD AUTO: 2.35 K/UL — SIGNIFICANT CHANGE UP (ref 1–3.3)
LYMPHOCYTES # BLD AUTO: 2.35 K/UL — SIGNIFICANT CHANGE UP (ref 1–3.3)
LYMPHOCYTES # BLD AUTO: 31.4 % — SIGNIFICANT CHANGE UP (ref 13–44)
LYMPHOCYTES # BLD AUTO: 31.4 % — SIGNIFICANT CHANGE UP (ref 13–44)
MCHC RBC-ENTMCNC: 29.6 PG — SIGNIFICANT CHANGE UP (ref 27–34)
MCHC RBC-ENTMCNC: 29.6 PG — SIGNIFICANT CHANGE UP (ref 27–34)
MCHC RBC-ENTMCNC: 34.2 GM/DL — SIGNIFICANT CHANGE UP (ref 32–36)
MCHC RBC-ENTMCNC: 34.2 GM/DL — SIGNIFICANT CHANGE UP (ref 32–36)
MCV RBC AUTO: 86.7 FL — SIGNIFICANT CHANGE UP (ref 80–100)
MCV RBC AUTO: 86.7 FL — SIGNIFICANT CHANGE UP (ref 80–100)
MONOCYTES # BLD AUTO: 0.65 K/UL — SIGNIFICANT CHANGE UP (ref 0–0.9)
MONOCYTES # BLD AUTO: 0.65 K/UL — SIGNIFICANT CHANGE UP (ref 0–0.9)
MONOCYTES NFR BLD AUTO: 8.7 % — SIGNIFICANT CHANGE UP (ref 2–14)
MONOCYTES NFR BLD AUTO: 8.7 % — SIGNIFICANT CHANGE UP (ref 2–14)
NEUTROPHILS # BLD AUTO: 4.36 K/UL — SIGNIFICANT CHANGE UP (ref 1.8–7.4)
NEUTROPHILS # BLD AUTO: 4.36 K/UL — SIGNIFICANT CHANGE UP (ref 1.8–7.4)
NEUTROPHILS NFR BLD AUTO: 58.3 % — SIGNIFICANT CHANGE UP (ref 43–77)
NEUTROPHILS NFR BLD AUTO: 58.3 % — SIGNIFICANT CHANGE UP (ref 43–77)
NITRITE UR-MCNC: NEGATIVE — SIGNIFICANT CHANGE UP
NITRITE UR-MCNC: NEGATIVE — SIGNIFICANT CHANGE UP
NRBC # BLD: 0 /100 WBCS — SIGNIFICANT CHANGE UP (ref 0–0)
NRBC # BLD: 0 /100 WBCS — SIGNIFICANT CHANGE UP (ref 0–0)
PH UR: 5.5 — SIGNIFICANT CHANGE UP (ref 5–8)
PH UR: 5.5 — SIGNIFICANT CHANGE UP (ref 5–8)
PLATELET # BLD AUTO: 185 K/UL — SIGNIFICANT CHANGE UP (ref 150–400)
PLATELET # BLD AUTO: 185 K/UL — SIGNIFICANT CHANGE UP (ref 150–400)
POTASSIUM SERPL-MCNC: 3.7 MMOL/L — SIGNIFICANT CHANGE UP (ref 3.5–5.3)
POTASSIUM SERPL-MCNC: 3.7 MMOL/L — SIGNIFICANT CHANGE UP (ref 3.5–5.3)
POTASSIUM SERPL-SCNC: 3.7 MMOL/L — SIGNIFICANT CHANGE UP (ref 3.5–5.3)
POTASSIUM SERPL-SCNC: 3.7 MMOL/L — SIGNIFICANT CHANGE UP (ref 3.5–5.3)
PROT SERPL-MCNC: 7.5 G/DL — SIGNIFICANT CHANGE UP (ref 6–8.3)
PROT SERPL-MCNC: 7.5 G/DL — SIGNIFICANT CHANGE UP (ref 6–8.3)
PROT UR-MCNC: NEGATIVE MG/DL — SIGNIFICANT CHANGE UP
PROT UR-MCNC: NEGATIVE MG/DL — SIGNIFICANT CHANGE UP
PROTHROM AB SERPL-ACNC: 11.3 SEC — SIGNIFICANT CHANGE UP (ref 9.5–13)
PROTHROM AB SERPL-ACNC: 11.3 SEC — SIGNIFICANT CHANGE UP (ref 9.5–13)
RBC # BLD: 4.96 M/UL — SIGNIFICANT CHANGE UP (ref 4.2–5.8)
RBC # BLD: 4.96 M/UL — SIGNIFICANT CHANGE UP (ref 4.2–5.8)
RBC # FLD: 13.3 % — SIGNIFICANT CHANGE UP (ref 10.3–14.5)
RBC # FLD: 13.3 % — SIGNIFICANT CHANGE UP (ref 10.3–14.5)
SODIUM SERPL-SCNC: 142 MMOL/L — SIGNIFICANT CHANGE UP (ref 135–145)
SODIUM SERPL-SCNC: 142 MMOL/L — SIGNIFICANT CHANGE UP (ref 135–145)
SP GR SPEC: 1.02 — SIGNIFICANT CHANGE UP (ref 1–1.03)
SP GR SPEC: 1.02 — SIGNIFICANT CHANGE UP (ref 1–1.03)
UROBILINOGEN FLD QL: 1 MG/DL — SIGNIFICANT CHANGE UP (ref 0.2–1)
UROBILINOGEN FLD QL: 1 MG/DL — SIGNIFICANT CHANGE UP (ref 0.2–1)
WBC # BLD: 7.48 K/UL — SIGNIFICANT CHANGE UP (ref 3.8–10.5)
WBC # BLD: 7.48 K/UL — SIGNIFICANT CHANGE UP (ref 3.8–10.5)
WBC # FLD AUTO: 7.48 K/UL — SIGNIFICANT CHANGE UP (ref 3.8–10.5)
WBC # FLD AUTO: 7.48 K/UL — SIGNIFICANT CHANGE UP (ref 3.8–10.5)

## 2023-10-25 PROCEDURE — 99285 EMERGENCY DEPT VISIT HI MDM: CPT

## 2023-10-25 RX ORDER — KETOROLAC TROMETHAMINE 30 MG/ML
30 SYRINGE (ML) INJECTION ONCE
Refills: 0 | Status: DISCONTINUED | OUTPATIENT
Start: 2023-10-25 | End: 2023-10-25

## 2023-10-25 RX ADMIN — Medication 30 MILLIGRAM(S): at 22:46

## 2023-10-25 NOTE — ED PROVIDER NOTE - OBJECTIVE STATEMENT
33-year-old male history of testicular cancer left orchiectomy during childhood renal stone complaining of right testicular pain for 1 week radiating down right leg as well as having lower abdominal pain.  Denies any fever or chills.  Initially had left back pain.  Also admits to having some difficulty urinating.  Admits to be sexually active but uses protection.

## 2023-10-25 NOTE — ED PROVIDER NOTE - PHYSICAL EXAMINATION
Gen: Alert, NAD  Head/eyes: NC/AT, PERRL  ENT: airway patent  Neck: supple  Pulm/lung: Bilateral clear BS  CV/heart: RRR  GI/Abd: soft, +ttp LLQ/ND, +BS, no guarding/rebound tenderness  : +cremasteric reflex on right, arash testicular lie, left orchiectomy   Musculoskeletal: no edema/erythema/cyanosis, no CVAT b/l  Skin: no rash  Neuro: AAOx3, grossly intact

## 2023-10-25 NOTE — ED PROVIDER NOTE - CLINICAL SUMMARY MEDICAL DECISION MAKING FREE TEXT BOX
33-year-old male history of testicular cancer left orchiectomy during childhood renal stone complaining of right testicular pain for 1 week radiating down right leg as well as having lower abdominal pain.  Denies any fever or chills.  Initially had left back pain.  Also admits to having some difficulty urinating.  Admits to be sexually active but uses protection. 36-year-old male history of testicular cancer left orchiectomy during childhood renal stone complaining of right testicular pain for 1 week radiating down right leg as well as having lower abdominal pain.  Denies any fever or chills.  Initially had left back pain.  Also admits to having some difficulty urinating.  Admits to be sexually active but uses protection.    r/o torsion, epididymitis, renal stone, UTI, labs, US/CT, IV analgesia

## 2023-10-25 NOTE — ED PROVIDER NOTE - PROGRESS NOTE DETAILS
Labs and imaging explained.  Patient further states that he has been having this on and off pain for 1 year.  Has been followed by a urologist who states everything was normal.  Will give patient referral to another urologist.

## 2023-10-25 NOTE — ED PROVIDER NOTE - CARE PROVIDER_API CALL
Steven Garcia Zac  Urology  [address] [facility] [address], [address]  [place] [address]  Phone: [phone]  Fax: [phone]  Follow Up Time:

## 2023-10-25 NOTE — ED ADULT NURSE NOTE - OBJECTIVE STATEMENT
Pt. received alert and oriented x3 with chief complaint of testicular pain w/ weakness for one month.

## 2023-10-25 NOTE — ED PROVIDER NOTE - PATIENT PORTAL LINK FT
You can access the FollowMyHealth Patient Portal offered by [facility] by registering at the following website: [email or web address]. By joining [employer]’s FollowMyHealth portal, you will also be able to view your health information using other applications (apps) compatible with our system.

## 2023-10-25 NOTE — ED ADULT NURSE NOTE - AS PAIN REST
Clinic Care Coordination Contact    Referral Type: Virtual Home Monitoring - Epic Care Companion    Patient referred for Virtual Home Monitoring Program for either COVID-19 or Community Acquired Pneumonia diagnosis following recent [facility] ED visit.  Epic Care Companion referral was also placed by provider.    Criteria for Virtual Home Monitoring telephone outreach is not met after review of ED encounter/ED provider note because:    1) ED provider note indicates assessment was negative for respiratory distress. O2 sats were stable throughout course of ED visit per chart review.      2) Patient was not discharged with new supplemental oxygen.    Per notes, ED provider and/or ED care team discussed appropriate follow up guidelines with patient prior to discharge or reflected these instructions on AVS.       Care Companion team remains available to support patient via [employer] account once activated by patient.     Sandy Schroeder RN  [facility]  - RN Care Coordinator     6 (moderate pain)

## 2023-10-25 NOTE — ED PROVIDER NOTE - NSFOLLOWUPINSTRUCTIONS_ED_ALL_ED_FT
1) Follow-up with your Primary Medical Doctor and Dr. Garcia. Call today / next business day for prompt follow-up.  2) Return to Emergency room for any worsening or persistent pain, weakness, fever, or any other concerning symptoms.  3) See attached instruction sheets for additional information, including information regarding signs and symptoms to look out for, reasons to seek immediate care and other important instructions.  4) Follow-up with any specialists as discussed / noted as well.   5) Percocet 5mg every 6 hours as needed for breakthrough pain.  Do not drive  6) Advil 600mg every 6 hour as needed for pain.  Take with food

## 2023-10-25 NOTE — ED ADULT NURSE NOTE - NSFALLUNIVINTERV_ED_ALL_ED
Bed/Stretcher in lowest position, wheels locked, appropriate side rails in place/Call bell, personal items and telephone in reach/Instruct patient to call for assistance before getting out of bed/chair/stretcher/Non-slip footwear applied when patient is off stretcher/[place] to call system/Physically safe environment - no spills, clutter or unnecessary equipment/Purposeful proactive rounding/Room/bathroom lighting operational, light cord in reach

## 2023-10-26 VITALS
TEMPERATURE: 98 F | DIASTOLIC BLOOD PRESSURE: 71 MMHG | SYSTOLIC BLOOD PRESSURE: 113 MMHG | RESPIRATION RATE: 17 BRPM | OXYGEN SATURATION: 98 % | HEART RATE: 67 BPM

## 2023-10-26 PROCEDURE — 85730 THROMBOPLASTIN TIME PARTIAL: CPT

## 2023-10-26 PROCEDURE — 93975 VASCULAR STUDY: CPT | Mod: 26

## 2023-10-26 PROCEDURE — 76870 US EXAM SCROTUM: CPT | Mod: 26

## 2023-10-26 PROCEDURE — 96375 TX/PRO/DX INJ NEW DRUG ADDON: CPT

## 2023-10-26 PROCEDURE — 93975 VASCULAR STUDY: CPT

## 2023-10-26 PROCEDURE — 74177 CT ABD & PELVIS W/CONTRAST: CPT | Mod: MA

## 2023-10-26 PROCEDURE — 80053 COMPREHEN METABOLIC PANEL: CPT

## 2023-10-26 PROCEDURE — 85025 COMPLETE CBC W/AUTO DIFF WBC: CPT

## 2023-10-26 PROCEDURE — 87086 URINE CULTURE/COLONY COUNT: CPT

## 2023-10-26 PROCEDURE — 76870 US EXAM SCROTUM: CPT

## 2023-10-26 PROCEDURE — 74177 CT ABD & PELVIS W/CONTRAST: CPT | Mod: 26,MA

## 2023-10-26 PROCEDURE — 96374 THER/PROPH/DIAG INJ IV PUSH: CPT | Mod: XU

## 2023-10-26 PROCEDURE — 36415 COLL VENOUS BLD VENIPUNCTURE: CPT

## 2023-10-26 PROCEDURE — 85610 PROTHROMBIN TIME: CPT

## 2023-10-26 PROCEDURE — 81001 URINALYSIS AUTO W/SCOPE: CPT

## 2023-10-26 PROCEDURE — 99284 EMERGENCY DEPT VISIT MOD MDM: CPT | Mod: 25

## 2023-10-26 PROCEDURE — 83690 ASSAY OF LIPASE: CPT

## 2023-10-26 RX ORDER — MORPHINE SULFATE 50 MG/1
4 CAPSULE, EXTENDED RELEASE ORAL ONCE
Refills: 0 | Status: DISCONTINUED | OUTPATIENT
Start: 2023-10-26 | End: 2023-10-26

## 2023-10-26 RX ORDER — OXYCODONE AND ACETAMINOPHEN 5; 325 MG/1; MG/1
1 TABLET ORAL
Qty: 12 | Refills: 0
Start: 2023-10-26 | End: 2023-10-28

## 2023-10-26 RX ADMIN — MORPHINE SULFATE 4 MILLIGRAM(S): 50 CAPSULE, EXTENDED RELEASE ORAL at 02:16

## 2023-10-27 LAB
CULTURE RESULTS: SIGNIFICANT CHANGE UP
CULTURE RESULTS: SIGNIFICANT CHANGE UP
SPECIMEN SOURCE: SIGNIFICANT CHANGE UP
SPECIMEN SOURCE: SIGNIFICANT CHANGE UP

## 2023-11-03 NOTE — ED ADULT NURSE NOTE - NS ED NURSE RECORD ANOTHER HT AND WT
Initiate Treatment: Suggested options of: Cerave/amlactin/eucerin/cetaphil cream daily after showers. Gentle cleansers discussed like cetaphil vs vanicream. Basic skin care reviewed. Suggested increasing water intake as well. Render In Strict Bullet Format?: No Detail Level: Detailed Yes

## 2024-09-19 NOTE — PATIENT PROFILE ADULT - HOW PATIENT ADDRESSED, PROFILE
pleasant, well nourished, well developed, in no acute distress , normal communication ability Bernardo

## 2024-12-13 ENCOUNTER — EMERGENCY (EMERGENCY)
Facility: HOSPITAL | Age: 34
LOS: 1 days | Discharge: HOME | End: 2024-12-13
Attending: STUDENT IN AN ORGANIZED HEALTH CARE EDUCATION/TRAINING PROGRAM | Admitting: STUDENT IN AN ORGANIZED HEALTH CARE EDUCATION/TRAINING PROGRAM
Payer: SELF-PAY

## 2024-12-13 VITALS
RESPIRATION RATE: 19 BRPM | HEIGHT: 68 IN | TEMPERATURE: 99 F | HEART RATE: 117 BPM | OXYGEN SATURATION: 98 % | DIASTOLIC BLOOD PRESSURE: 76 MMHG | SYSTOLIC BLOOD PRESSURE: 126 MMHG | WEIGHT: 145.06 LBS

## 2024-12-13 DIAGNOSIS — C62.90 MALIGNANT NEOPLASM OF UNSPECIFIED TESTIS, UNSPECIFIED WHETHER DESCENDED OR UNDESCENDED: Chronic | ICD-10-CM

## 2024-12-13 PROCEDURE — 99285 EMERGENCY DEPT VISIT HI MDM: CPT

## 2024-12-13 PROCEDURE — 71045 X-RAY EXAM CHEST 1 VIEW: CPT | Mod: 26

## 2024-12-13 PROCEDURE — 99053 MED SERV 10PM-8AM 24 HR FAC: CPT

## 2024-12-13 RX ORDER — KETOROLAC TROMETHAMINE 30 MG/ML
15 INJECTION INTRAMUSCULAR; INTRAVENOUS ONCE
Refills: 0 | Status: DISCONTINUED | OUTPATIENT
Start: 2024-12-13 | End: 2024-12-13

## 2024-12-13 RX ORDER — SODIUM CHLORIDE 9 MG/ML
1000 INJECTION, SOLUTION INTRAMUSCULAR; INTRAVENOUS; SUBCUTANEOUS ONCE
Refills: 0 | Status: COMPLETED | OUTPATIENT
Start: 2024-12-13 | End: 2024-12-13

## 2024-12-13 RX ORDER — ONDANSETRON HYDROCHLORIDE 4 MG/1
4 TABLET, FILM COATED ORAL ONCE
Refills: 0 | Status: COMPLETED | OUTPATIENT
Start: 2024-12-13 | End: 2024-12-13

## 2024-12-13 RX ORDER — ACETAMINOPHEN 500MG 500 MG/1
1000 TABLET, COATED ORAL ONCE
Refills: 0 | Status: COMPLETED | OUTPATIENT
Start: 2024-12-13 | End: 2024-12-13

## 2024-12-13 RX ADMIN — SODIUM CHLORIDE 1000 MILLILITER(S): 9 INJECTION, SOLUTION INTRAMUSCULAR; INTRAVENOUS; SUBCUTANEOUS at 23:57

## 2024-12-13 RX ADMIN — ACETAMINOPHEN 500MG 400 MILLIGRAM(S): 500 TABLET, COATED ORAL at 23:58

## 2024-12-13 RX ADMIN — KETOROLAC TROMETHAMINE 15 MILLIGRAM(S): 30 INJECTION INTRAMUSCULAR; INTRAVENOUS at 23:57

## 2024-12-13 RX ADMIN — ONDANSETRON HYDROCHLORIDE 4 MILLIGRAM(S): 4 TABLET, FILM COATED ORAL at 23:57

## 2024-12-13 NOTE — ED ADULT TRIAGE NOTE - CHIEF COMPLAINT QUOTE
pt. came in from  advised to come here for evaluation, pt. is complaining of on and off abdominal pain and tenderness for 3 months, w/ known hx. of testicular, stomach, bladder, spleen and Kidney  CA, as per the pt. 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 claims that he feels also experiencing flu-symptoms, swabs done at , resulted NEGATIVE, pt. came in from  advised to come here for evaluation, pt. is complaining of on and off abdominal pain and tenderness for 3 months, w/ known hx. of testicular, stomach, bladder, spleen and Kidney  CA, as per the pt. 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 claims that he feels also experiencing flu-symptoms, swabs done at , resulted NEGATIVE, afebrile, tachycardic = 117

## 2024-12-13 NOTE — ED PROVIDER NOTE - NSFOLLOWUPINSTRUCTIONS_ED_ALL_ED_FT
Please take over the counter pain medications such as Motrin (600mg every 6 hours) and Tylenol (650mg every 4 hours) for pain     Take Zofran as needed up to three times a day for nausea/vomiting. Drink plenty of fluids to stay hydrated. Avoid hot, spicy, acidic foods for the next few days    Follow up with your primary care doctor in 2-3 days for further evaluation of your symptoms     Return to the Emergency Department if you have any new or worsening symptoms

## 2024-12-13 NOTE — ED ADULT TRIAGE NOTE - CCCP TRG CHIEF CMPLNT
abdominal pain / urinary problem/abdominal pain abdominal pain / urinary problem / fever/abdominal pain

## 2024-12-13 NOTE — ED PROVIDER NOTE - OBJECTIVE STATEMENT
34M PMH L testicular ca with mets to stomach/spleen/kidney bladder (s/p L orchiectomy, radiation, chemo as a child) p/w a few days of L mid abd pain, chest pain, SOB. Had long car ride form [place] to [place] to [place] 2 weeks ago. Currently on a statin for elevated cholesterol after stem cell transplant. Had fever today to 100.9F. No BM for past few days; no current midline back pain. Pain is located in L flank as well

## 2024-12-13 NOTE — ED ADULT TRIAGE NOTE - PATIENT'S GENDER IDENTITY
Quality 226: Preventive Care And Screening: Tobacco Use: Screening And Cessation Intervention: Patient screened for tobacco use and is an ex/non-smoker
Quality 431: Preventive Care And Screening: Unhealthy Alcohol Use - Screening: Patient identified as an unhealthy alcohol user when screened for unhealthy alcohol use using a systematic screening method and received brief counseling
Detail Level: Detailed
Male

## 2024-12-13 NOTE — ED PROVIDER NOTE - CLINICAL SUMMARY MEDICAL DECISION MAKING FREE TEXT BOX
34M p/w abd pain, chest pain, fever. VS and exam as above. ECG nondiagnostic  DDx includes but not limited to: pna vs uri vs viral uri vs hepatobiliary etiology vs colitis vs gastro   Plan: cardiac/sepsis labs, ct chest/a/p, reassess symptoms    See Progress Notes for further updates on ED Course

## 2024-12-13 NOTE — ED PROVIDER NOTE - PATIENT PORTAL LINK FT
You can access the FollowMyHealth Patient Portal offered by [facility] by registering at the following website: [email or web address]. By joining [employer]’s FollowMyHealth portal, you will also be able to view your health information using other applications (apps) compatible with our system.

## 2024-12-13 NOTE — ED PROVIDER NOTE - PROGRESS NOTE DETAILS
labs and imaging nonactionable; WBC wnl and procal minimally elevated. symptoms likely 2/2 viral URI. explained results of w/u to pt and all quests answered. I gave strict return precautions to pt. told to f/u urgently with pcp. will dc to home

## 2024-12-13 NOTE — ED PROVIDER NOTE - PHYSICAL EXAMINATION
Gen: NAD, non-toxic appearing, awake alert   HEENT: normal conjunctiva, oral mucosa moist  Lung: CTAB, no respiratory distress, no wheezes/rhonchi/rales B/L, speaking in full sentences  CV: Regular, tachy  Abd: soft, L mid abd mild TTP, ND, no guarding, no rigidity, no rebound tenderness, L CVA tenderness   MSK: no visible deformities, ROM normal in UE/LE, no spinal tenderness   Neuro: No focal sensory or motor deficits  Skin: Warm, well perfused, no rash, no leg swelling

## 2024-12-14 VITALS
OXYGEN SATURATION: 100 % | RESPIRATION RATE: 17 BRPM | TEMPERATURE: 99 F | HEART RATE: 76 BPM | SYSTOLIC BLOOD PRESSURE: 112 MMHG | DIASTOLIC BLOOD PRESSURE: 71 MMHG

## 2024-12-14 LAB
ALBUMIN SERPL ELPH-MCNC: 4.4 G/DL — SIGNIFICANT CHANGE UP (ref 3.3–5)
ALP SERPL-CCNC: 78 U/L — SIGNIFICANT CHANGE UP (ref 40–120)
ALT FLD-CCNC: 38 U/L — SIGNIFICANT CHANGE UP (ref 12–78)
ANION GAP SERPL CALC-SCNC: 7 MMOL/L — SIGNIFICANT CHANGE UP (ref 5–17)
APPEARANCE UR: CLEAR — SIGNIFICANT CHANGE UP
AST SERPL-CCNC: 25 U/L — SIGNIFICANT CHANGE UP (ref 15–37)
BASOPHILS # BLD AUTO: 0.03 K/UL — SIGNIFICANT CHANGE UP (ref 0–0.2)
BASOPHILS NFR BLD AUTO: 0.3 % — SIGNIFICANT CHANGE UP (ref 0–2)
BILIRUB SERPL-MCNC: 0.7 MG/DL — SIGNIFICANT CHANGE UP (ref 0.2–1.2)
BILIRUB UR-MCNC: NEGATIVE — SIGNIFICANT CHANGE UP
BUN SERPL-MCNC: 11 MG/DL — SIGNIFICANT CHANGE UP (ref 7–23)
CALCIUM SERPL-MCNC: 9.9 MG/DL — SIGNIFICANT CHANGE UP (ref 8.5–10.1)
CHLORIDE SERPL-SCNC: 104 MMOL/L — SIGNIFICANT CHANGE UP (ref 96–108)
CO2 SERPL-SCNC: 28 MMOL/L — SIGNIFICANT CHANGE UP (ref 22–31)
COLOR SPEC: YELLOW — SIGNIFICANT CHANGE UP
CREAT SERPL-MCNC: 1.3 MG/DL — SIGNIFICANT CHANGE UP (ref 0.5–1.3)
DIFF PNL FLD: ABNORMAL
EGFR: 73 ML/MIN/1.73M2 — SIGNIFICANT CHANGE UP
EOSINOPHIL # BLD AUTO: 0.12 K/UL — SIGNIFICANT CHANGE UP (ref 0–0.5)
EOSINOPHIL NFR BLD AUTO: 1.3 % — SIGNIFICANT CHANGE UP (ref 0–6)
GLUCOSE SERPL-MCNC: 118 MG/DL — HIGH (ref 70–99)
GLUCOSE UR QL: NEGATIVE MG/DL — SIGNIFICANT CHANGE UP
HCOV PNL SPEC NAA+PROBE: DETECTED
HCT VFR BLD CALC: 46.8 % — SIGNIFICANT CHANGE UP (ref 39–50)
HGB BLD-MCNC: 16.5 G/DL — SIGNIFICANT CHANGE UP (ref 13–17)
IMM GRANULOCYTES NFR BLD AUTO: 0.3 % — SIGNIFICANT CHANGE UP (ref 0–0.9)
KETONES UR-MCNC: NEGATIVE MG/DL — SIGNIFICANT CHANGE UP
LEUKOCYTE ESTERASE UR-ACNC: NEGATIVE — SIGNIFICANT CHANGE UP
LIDOCAIN IGE QN: 34 U/L — SIGNIFICANT CHANGE UP (ref 13–75)
LYMPHOCYTES # BLD AUTO: 1.46 K/UL — SIGNIFICANT CHANGE UP (ref 1–3.3)
LYMPHOCYTES # BLD AUTO: 15.5 % — SIGNIFICANT CHANGE UP (ref 13–44)
MCHC RBC-ENTMCNC: 29.6 PG — SIGNIFICANT CHANGE UP (ref 27–34)
MCHC RBC-ENTMCNC: 35.3 G/DL — SIGNIFICANT CHANGE UP (ref 32–36)
MCV RBC AUTO: 84 FL — SIGNIFICANT CHANGE UP (ref 80–100)
MONOCYTES # BLD AUTO: 0.96 K/UL — HIGH (ref 0–0.9)
MONOCYTES NFR BLD AUTO: 10.2 % — SIGNIFICANT CHANGE UP (ref 2–14)
NEUTROPHILS # BLD AUTO: 6.82 K/UL — SIGNIFICANT CHANGE UP (ref 1.8–7.4)
NEUTROPHILS NFR BLD AUTO: 72.4 % — SIGNIFICANT CHANGE UP (ref 43–77)
NITRITE UR-MCNC: NEGATIVE — SIGNIFICANT CHANGE UP
NRBC # BLD: 0 /100 WBCS — SIGNIFICANT CHANGE UP (ref 0–0)
NT-PROBNP SERPL-SCNC: 11 PG/ML — SIGNIFICANT CHANGE UP (ref 0–125)
PH UR: 5 — SIGNIFICANT CHANGE UP (ref 5–8)
PLATELET # BLD AUTO: 266 K/UL — SIGNIFICANT CHANGE UP (ref 150–400)
POTASSIUM SERPL-MCNC: 4 MMOL/L — SIGNIFICANT CHANGE UP (ref 3.5–5.3)
POTASSIUM SERPL-SCNC: 4 MMOL/L — SIGNIFICANT CHANGE UP (ref 3.5–5.3)
PROCALCITONIN SERPL-MCNC: 0.11 NG/ML — HIGH
PROT SERPL-MCNC: 8.5 G/DL — HIGH (ref 6–8.3)
PROT UR-MCNC: NEGATIVE MG/DL — SIGNIFICANT CHANGE UP
RAPID RVP RESULT: DETECTED
RBC # BLD: 5.57 M/UL — SIGNIFICANT CHANGE UP (ref 4.2–5.8)
RBC # FLD: 12.6 % — SIGNIFICANT CHANGE UP (ref 10.3–14.5)
SARS-COV-2 RNA SPEC QL NAA+PROBE: SIGNIFICANT CHANGE UP
SODIUM SERPL-SCNC: 139 MMOL/L — SIGNIFICANT CHANGE UP (ref 135–145)
SP GR SPEC: 10.1 — HIGH (ref 1–1.03)
TROPONIN I, HIGH SENSITIVITY RESULT: 3.2 NG/L — SIGNIFICANT CHANGE UP
UROBILINOGEN FLD QL: 4 MG/DL (ref 0.2–1)
WBC # BLD: 9.42 K/UL — SIGNIFICANT CHANGE UP (ref 3.8–10.5)
WBC # FLD AUTO: 9.42 K/UL — SIGNIFICANT CHANGE UP (ref 3.8–10.5)

## 2024-12-14 PROCEDURE — 85025 COMPLETE CBC W/AUTO DIFF WBC: CPT

## 2024-12-14 PROCEDURE — 83690 ASSAY OF LIPASE: CPT

## 2024-12-14 PROCEDURE — 74177 CT ABD & PELVIS W/CONTRAST: CPT | Mod: MC

## 2024-12-14 PROCEDURE — 93005 ELECTROCARDIOGRAM TRACING: CPT

## 2024-12-14 PROCEDURE — 81001 URINALYSIS AUTO W/SCOPE: CPT

## 2024-12-14 PROCEDURE — 80053 COMPREHEN METABOLIC PANEL: CPT

## 2024-12-14 PROCEDURE — 99285 EMERGENCY DEPT VISIT HI MDM: CPT | Mod: 25

## 2024-12-14 PROCEDURE — 83880 ASSAY OF NATRIURETIC PEPTIDE: CPT

## 2024-12-14 PROCEDURE — 0225U NFCT DS DNA&RNA 21 SARSCOV2: CPT

## 2024-12-14 PROCEDURE — 71275 CT ANGIOGRAPHY CHEST: CPT | Mod: MC

## 2024-12-14 PROCEDURE — 71045 X-RAY EXAM CHEST 1 VIEW: CPT

## 2024-12-14 PROCEDURE — 96375 TX/PRO/DX INJ NEW DRUG ADDON: CPT | Mod: XU

## 2024-12-14 PROCEDURE — 87040 BLOOD CULTURE FOR BACTERIA: CPT

## 2024-12-14 PROCEDURE — 36415 COLL VENOUS BLD VENIPUNCTURE: CPT

## 2024-12-14 PROCEDURE — 84484 ASSAY OF TROPONIN QUANT: CPT

## 2024-12-14 PROCEDURE — 96374 THER/PROPH/DIAG INJ IV PUSH: CPT | Mod: XU

## 2024-12-14 PROCEDURE — 84145 PROCALCITONIN (PCT): CPT

## 2024-12-14 PROCEDURE — 71275 CT ANGIOGRAPHY CHEST: CPT | Mod: 26,MC

## 2024-12-14 PROCEDURE — 93010 ELECTROCARDIOGRAM REPORT: CPT

## 2024-12-14 PROCEDURE — 74177 CT ABD & PELVIS W/CONTRAST: CPT | Mod: 26,MC

## 2024-12-14 RX ORDER — ALBUTEROL 90 MCG
2 AEROSOL (GRAM) INHALATION
Qty: 1 | Refills: 0
Start: 2024-12-14 | End: 2024-12-16

## 2024-12-14 RX ORDER — ONDANSETRON HYDROCHLORIDE 4 MG/1
1 TABLET, FILM COATED ORAL
Qty: 9 | Refills: 0
Start: 2024-12-14 | End: 2024-12-16

## 2024-12-14 NOTE — ED ADULT NURSE NOTE - CHIEF COMPLAINT QUOTE
pt. came in from  advised to come here for evaluation, pt. is complaining of on and off abdominal pain and tenderness for 3 months, w/ known hx. of testicular, stomach, bladder, spleen and Kidney  CA, as per the pt. 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 claims that he feels also experiencing flu-symptoms, swabs done at , resulted NEGATIVE, afebrile, tachycardic = 117

## 2024-12-14 NOTE — ED ADULT NURSE NOTE - OBJECTIVE STATEMENT
pt presents w complaints of abdominal pain w vomiting x 1 month, recent resp symptoms consisting of dry bloody nose, runny nose and headache. Pt takes nyquil for symptoms at home. pt went to UC today for eval, pt sent to ER for further care. Tachy on assessment. 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
